# Patient Record
Sex: MALE | Race: WHITE | NOT HISPANIC OR LATINO | Employment: FULL TIME | URBAN - METROPOLITAN AREA
[De-identification: names, ages, dates, MRNs, and addresses within clinical notes are randomized per-mention and may not be internally consistent; named-entity substitution may affect disease eponyms.]

---

## 2024-03-04 DIAGNOSIS — Z95.828 S/P TIPS (TRANSJUGULAR INTRAHEPATIC PORTOSYSTEMIC SHUNT): ICD-10-CM

## 2024-03-04 DIAGNOSIS — K70.31 ALCOHOLIC CIRRHOSIS OF LIVER WITH ASCITES (MULTI): ICD-10-CM

## 2024-03-04 DIAGNOSIS — K76.9 LIVER LESION: Primary | ICD-10-CM

## 2024-03-05 ENCOUNTER — DOCUMENTATION (OUTPATIENT)
Dept: TRANSPLANT | Facility: HOSPITAL | Age: 64
End: 2024-03-05
Payer: COMMERCIAL

## 2024-03-05 ENCOUNTER — TELEPHONE (OUTPATIENT)
Dept: TRANSPLANT | Facility: HOSPITAL | Age: 64
End: 2024-03-05
Payer: COMMERCIAL

## 2024-03-05 ENCOUNTER — HOSPITAL ENCOUNTER (INPATIENT)
Age: 64
End: 2024-03-05
Attending: SURGERY | Admitting: SURGERY
Payer: COMMERCIAL

## 2024-03-05 NOTE — TELEPHONE ENCOUNTER
Called for intake, no answer.  Patient does not have an email set up.  Will attempt to call tomorrow.

## 2024-03-05 NOTE — TELEPHONE ENCOUNTER
How did you hear about [facility]?   MD  Referral Source comments   NONE  Have you ever contacted the [facility] before?   NONE  Are you currently listed or being evaluated by any other transplant center?   NO  Where are you currently listed or being evaluated for transplant?   NO  Are you also seeking other organ transplant(s)?   NO  Are you a ?   NO  Do you have your own transportation?   YES  Do you have a 's license?   YES  Do you have a working vehicle?   YES  Do you have someone in your support system that can drive you to and from all your appointments as needed?   YES  Are you prepared to drive to [facility] in [place], [place] for all necessary appointments? If not, you may need to speak with a  prior to scheduling your appointments. We are unable to assist with transportation or parking.   YES  Transportation Comments   NONE    GENERAL HEALTH STATUS LI  What is the primary cause of your organ disease?   ALCOHOL  Have you had a heart attack?   NO  When was your heart attack?   N/A  Where was your heart attack treated?   N/A  Have you had a stroke?   NO  When was your stroke?   N/A  Where was your stroke treated?   N/A     GENERAL HEALTH STATUS HT  Have you had any pregnancies?   N/A  If yes, how many pregnancies?   N/A  Have you had any transfusions?   YES  If yes, how many transfusions?   3-4  Have you ever been on dialysis?   NO    DIABETES HISTORY LI  What type of diabetes do you have?   TYPE II  Are you taking insulin for your diabetes?   NO    CANCER HISTORY  Where is cancer located?   NO  When was the diagnosis made?   N/A  Who provided cancer treatment?   N/A     MENTAL HEALTH HISTORY LI  Are you currently or have you previously been seen by a mental health professional?   NO  Who is the treating doctor for your mental health issues?   N/A  Did you receive a specific diagnosis?   N/A    SUBSTANCE USE HISTORY LI  Are you a current or  former tobacco user?   NO  Describe your tobacco use.   N/A  When did you quit using tobacco products?   N/A  Are you a current or former alcohol user?   YES  Describe your alcohol use.   SOCIAL, WHICH LED TO EVERY DAY  When did you quit drinking alcohol?   2 YEARS AGO  Have you ever received treatment for alcohol abuse?   NO  When and where did you receive treatment for alcohol abuse?   N/A  Are you a current or former marijuana user?   NO  Describe your marijuana use.   N/A  When did you quit using marijuana?   N/A  Have you ever received treatment for marijuana abuse?   N/A  When and where did you receive treatment for marijuana abuse?   N/A  Are you a current or former user of illegal drugs such as heroin, cocaine, meth, crack, or any other recreational substance?   NO  Describe your illegal drug use.   N/A  When did you quit using illegal drugs?   N/A  Have you ever received treatment for illegal drug abuse?   N/A  When and where did you receive treatment for illegal drug abuse?   N/A    DIAGNOSTIC TESTING LI  Have you had a colonoscopy in the last 5 years?   NO  Where was the colonoscopy performed?   N/A  Have you had a EGD (Endoscopy)?   YES  Where was the EGD(Endoscopy) Performed?   DR TORRES [facility] [place]    COMMENTS  NONE

## 2024-03-07 ENCOUNTER — DOCUMENTATION (OUTPATIENT)
Dept: TRANSPLANT | Facility: HOSPITAL | Age: 64
End: 2024-03-07
Payer: COMMERCIAL

## 2024-03-18 ENCOUNTER — TELEPHONE (OUTPATIENT)
Dept: TRANSPLANT | Facility: HOSPITAL | Age: 64
End: 2024-03-18
Payer: COMMERCIAL

## 2024-03-18 DIAGNOSIS — Z01.818 ENCOUNTER FOR PRE-TRANSPLANT EVALUATION FOR LIVER TRANSPLANT: ICD-10-CM

## 2024-03-20 ENCOUNTER — DOCUMENTATION (OUTPATIENT)
Dept: TRANSPLANT | Facility: HOSPITAL | Age: 64
End: 2024-03-20
Payer: COMMERCIAL

## 2024-03-20 NOTE — Clinical Note
Rec'd [facility] [place] fax for liver txp eval valid 03/18/24-03/17/25.  Listing approval will be needed.

## 2024-03-20 NOTE — PROGRESS NOTES
Rec'd [facility] [place] fax [place] liver txp eval valid 03/18/24-03/17/25.  Listing approval will be needed.

## 2024-03-21 DIAGNOSIS — Z01.818 ENCOUNTER FOR PRE-TRANSPLANT EVALUATION FOR LIVER TRANSPLANT: ICD-10-CM

## 2024-03-27 ENCOUNTER — TELEPHONE (OUTPATIENT)
Dept: TRANSPLANT | Facility: HOSPITAL | Age: 64
End: 2024-03-27
Payer: COMMERCIAL

## 2024-03-27 DIAGNOSIS — K76.9 LIVER LESION, RIGHT LOBE: Primary | ICD-10-CM

## 2024-03-27 DIAGNOSIS — K70.31 ALCOHOLIC CIRRHOSIS OF LIVER WITH ASCITES (MULTI): ICD-10-CM

## 2024-03-27 NOTE — TELEPHONE ENCOUNTER
Patient called to notify office that he had a fall at work and has a fractured sternum and is now on worker's comp. Patient asked about postponing education and initial evaluation appointments. Coordinator reviewed concern for large(4.5cm) lesion in liver and explained process of reviewing imaging at TB and proceeding with recommendation for biopsy or treatment. Agreed on plan to hold off on education for now but will proceed with TB and possible IR referral. Discussed with Dr. Blanco. Added to TB agenda for 4/1/24.

## 2024-04-01 ENCOUNTER — TUMOR BOARD CONFERENCE (OUTPATIENT)
Dept: HEMATOLOGY/ONCOLOGY | Facility: HOSPITAL | Age: 64
End: 2024-04-01
Payer: COMMERCIAL

## 2024-04-01 NOTE — TUMOR BOARD NOTE
Appears to be 2 LIRADS 5 lesions.  Poor quality.  Likely multifocal disease.  Recommend repeat imaging to better assess.  Labs to assess liver function, AFP    No treatment recommendations at this time.

## 2024-04-02 ENCOUNTER — DOCUMENTATION (OUTPATIENT)
Dept: TRANSPLANT | Facility: HOSPITAL | Age: 64
End: 2024-04-02
Payer: COMMERCIAL

## 2024-04-02 ENCOUNTER — APPOINTMENT (OUTPATIENT)
Dept: TRANSPLANT | Facility: HOSPITAL | Age: 64
End: 2024-04-02
Payer: COMMERCIAL

## 2024-04-02 NOTE — PROGRESS NOTES
Spoke to patient and spouse via the phone re [place] benefits.  Patient and spouse work for the same employer.  Patient is working light duty at this time due to workmans comp.  Discussed SS disability.  Patient gets his meds through [employer].

## 2024-04-03 ENCOUNTER — TELEPHONE (OUTPATIENT)
Dept: TRANSPLANT | Facility: HOSPITAL | Age: 64
End: 2024-04-03
Payer: COMMERCIAL

## 2024-04-04 ENCOUNTER — APPOINTMENT (OUTPATIENT)
Dept: TRANSPLANT | Facility: HOSPITAL | Age: 64
End: 2024-04-04
Payer: COMMERCIAL

## 2024-04-04 ENCOUNTER — APPOINTMENT (OUTPATIENT)
Dept: CARDIOLOGY | Facility: HOSPITAL | Age: 64
End: 2024-04-04
Payer: COMMERCIAL

## 2024-04-15 ENCOUNTER — APPOINTMENT (OUTPATIENT)
Dept: TRANSPLANT | Facility: HOSPITAL | Age: 64
End: 2024-04-15
Payer: COMMERCIAL

## 2024-04-26 ENCOUNTER — OFFICE VISIT (OUTPATIENT)
Dept: CARDIOLOGY | Facility: CLINIC | Age: 64
End: 2024-04-26
Payer: COMMERCIAL

## 2024-04-26 VITALS
HEIGHT: 70 IN | SYSTOLIC BLOOD PRESSURE: 90 MMHG | HEART RATE: 63 BPM | WEIGHT: 216 LBS | DIASTOLIC BLOOD PRESSURE: 62 MMHG | BODY MASS INDEX: 30.92 KG/M2

## 2024-04-26 DIAGNOSIS — K74.60 HEPATIC CIRRHOSIS, UNSPECIFIED HEPATIC CIRRHOSIS TYPE, UNSPECIFIED WHETHER ASCITES PRESENT (MULTI): ICD-10-CM

## 2024-04-26 DIAGNOSIS — E11.9 DIABETES MELLITUS TYPE II, NON INSULIN DEPENDENT (MULTI): ICD-10-CM

## 2024-04-26 DIAGNOSIS — R94.31 ABNORMAL EKG: ICD-10-CM

## 2024-04-26 DIAGNOSIS — S26.91XS CONTUSION OF HEART, SEQUELA: Primary | ICD-10-CM

## 2024-04-26 DIAGNOSIS — S22.20XS CLOSED FRACTURE OF STERNUM, UNSPECIFIED PORTION OF STERNUM, SEQUELA: ICD-10-CM

## 2024-04-26 DIAGNOSIS — I48.0 PAROXYSMAL ATRIAL FIBRILLATION (MULTI): ICD-10-CM

## 2024-04-26 PROBLEM — S26.91XA CARDIAC CONTUSION: Status: ACTIVE | Noted: 2024-04-26

## 2024-04-26 PROBLEM — S22.20XA STERNAL FRACTURE: Status: ACTIVE | Noted: 2024-04-26

## 2024-04-26 PROCEDURE — 3074F SYST BP LT 130 MM HG: CPT | Performed by: INTERNAL MEDICINE

## 2024-04-26 PROCEDURE — 1036F TOBACCO NON-USER: CPT | Performed by: INTERNAL MEDICINE

## 2024-04-26 PROCEDURE — 99204 OFFICE O/P NEW MOD 45 MIN: CPT | Performed by: INTERNAL MEDICINE

## 2024-04-26 PROCEDURE — 3008F BODY MASS INDEX DOCD: CPT | Performed by: INTERNAL MEDICINE

## 2024-04-26 PROCEDURE — 3078F DIAST BP <80 MM HG: CPT | Performed by: INTERNAL MEDICINE

## 2024-04-26 PROCEDURE — 93000 ELECTROCARDIOGRAM COMPLETE: CPT | Performed by: INTERNAL MEDICINE

## 2024-04-26 RX ORDER — ONDANSETRON 4 MG/1
1 TABLET, FILM COATED ORAL EVERY 6 HOURS PRN
COMMUNITY
Start: 2022-12-28

## 2024-04-26 RX ORDER — RIFAXIMIN 550 MG/1
1 TABLET ORAL 2 TIMES DAILY
COMMUNITY
Start: 2021-11-30

## 2024-04-26 RX ORDER — FERROUS SULFATE 325(65) MG
1 TABLET ORAL DAILY
COMMUNITY

## 2024-04-26 RX ORDER — FUROSEMIDE 80 MG/1
80 TABLET ORAL DAILY
COMMUNITY
Start: 2023-04-10

## 2024-04-26 RX ORDER — SPIRONOLACTONE 25 MG/1
50 TABLET ORAL 2 TIMES DAILY
COMMUNITY

## 2024-04-26 RX ORDER — SITAGLIPTIN 100 MG/1
100 TABLET, FILM COATED ORAL DAILY
COMMUNITY
Start: 2023-06-01

## 2024-04-26 RX ORDER — METFORMIN HYDROCHLORIDE 500 MG/1
500 TABLET ORAL EVERY 12 HOURS
COMMUNITY
Start: 2018-09-14

## 2024-04-26 RX ORDER — LACTULOSE 10 G/15ML
30 SOLUTION ORAL DAILY
COMMUNITY

## 2024-04-26 RX ORDER — OMEPRAZOLE 20 MG/1
20 CAPSULE, DELAYED RELEASE ORAL
COMMUNITY
Start: 2023-06-05

## 2024-04-26 RX ORDER — PROPRANOLOL HYDROCHLORIDE 80 MG/1
80 CAPSULE, EXTENDED RELEASE ORAL DAILY
COMMUNITY
Start: 2024-02-01

## 2024-04-26 RX ORDER — ACETAMINOPHEN, DIPHENHYDRAMINE HCL, PHENYLEPHRINE HCL 325; 25; 5 MG/1; MG/1; MG/1
TABLET ORAL DAILY
COMMUNITY

## 2024-04-26 ASSESSMENT — ENCOUNTER SYMPTOMS
LIGHT-HEADEDNESS: 1
DIZZINESS: 1

## 2024-04-26 NOTE — PATIENT INSTRUCTIONS
Please bring all medicines, vitamins, and herbal supplements with you when you come to the office.    Prescriptions will not be filled unless you are compliant with your follow up appointments or have a follow up appointment scheduled as per instruction of your physician. Refills should be requested at the time of your visit.     BMI was above normal measurement. Current weight: 98 kg (216 lb)  Weight change since last visit (-) denotes wt loss 216 lbs   Weight loss needed to achieve BMI 25: 42.1 Lbs  Weight loss needed to achieve BMI 30: 7.4 Lbs  Provided instructions on dietary changes.    Echo   Follow up 4-5 months

## 2024-04-26 NOTE — LETTER
April 26, 2024     Shakir Huerta DO  [address]  [address] [place]  [place] [address]    Patient: Travis Traore   YOB: 1960   Date of Visit: 4/26/2024       Dear Dr. Shakir Huerta, DO:    Thank you for referring Travis Traore to me for evaluation. Below are my notes for this consultation.  If you have questions, please do not hesitate to call me. I look forward to following your patient along with you.       Sincerely,     Feliciano Duval MD      CC: No Recipients  ______________________________________________________________________________________    Cardiology Consultation- New Consult    Reason for referral: Questionable atrial fibrillation and abnormal EKG    HPI: Travis Traore is a 63 y.o. male with history of advanced liver cirrhosis in the process of being evaluated for liver transplant.  Was seen recently by corporate health.  Apparently the patient had an accident at work.  Details lacking but apparently a heavy object fell onto his chest.  His wife report he had a sternal fracture treated conservatively.  I do not have any of those records.  Apparently an EKG was done and showed nonspecific ST-T changes but there is notation that observation of atrial fibrillation even though I do not have any documentation of A-fib.  The patient describes some mild chest wall tenderness.  He denies lightheadedness, dizziness or syncope.  He denies any previous history of coronary artery disease, congestive heart failure or valvular heart disease.  He described functional class I and reported he is active.  His EKG today showing normal sinus rhythm with nonspecific ST-T changes.    Assessment    1.  Abnormal EKG showing nonspecific ST-T changes patient does not exhibit any cardiac symptoms and he is low risk for ischemic heart disease  2.  Reported history of chest wall trauma/contusion with questionable sternal fracture and possible previous cardiac confusion does not  appear to have any sequela  3.  Advanced liver to cirrhosis in the process of being evaluated for transplant  4.  Type 2 diabetes mellitus    Plan    1.  I recommended to continue with observant approach  2.  Will try to retrieve his previous record including previous EKG to see if there is any documentation of atrial fibrillation however even if the patient has previous history of A-fib at the moment no need for treatment and he is not a good candidate for anticoagulation considering his advanced liver disease and prior history of GI bleed  3.  Will see him back in the office in 4 months and follow-up      Past Medical History:   He has no past medical history on file.    Surgical History:   He has a past surgical history that includes CT angio abdomen pelvis w and or wo IV IV contrast (03/24/2020); IR intervention tips placement (03/26/2020); IR CVC tunneled (03/26/2020); Cholecystectomy; Back surgery; Wrist surgery; and Colonoscopy (2015).    Family History:   Family History   Problem Relation Name Age of Onset   • Diabetes Mother         Social History:   Social History     Tobacco Use   • Smoking status: Never   • Smokeless tobacco: Never   Substance Use Topics   • Alcohol use: Not Currently        Allergies:  Patient has no known allergies.     Current Medications:    Current Outpatient Medications:   •  cyanocobalamin, vitamin B-12, (Vitamin B-12) 5,000 mcg tablet, sublingual, Place under the tongue once daily., Disp: , Rfl:   •  ferrous sulfate, 325 mg ferrous sulfate, tablet, Take 1 tablet by mouth once daily., Disp: , Rfl:   •  furosemide (Lasix) 80 mg tablet, 1 tablet (80 mg) once daily., Disp: , Rfl:   •  Januvia 100 mg tablet, 1 tablet (100 mg) once daily., Disp: , Rfl:   •  lactulose 20 gram/30 mL oral solution, Take 45 mL (30 g) by mouth once daily., Disp: , Rfl:   •  metFORMIN (Glucophage) 500 mg tablet, 1 tablet (500 mg) every 12 hours., Disp: , Rfl:   •  omeprazole (PriLOSEC) 20 mg DR capsule, 1  "capsule (20 mg) 2 times a day before meals., Disp: , Rfl:   •  ondansetron (Zofran) 4 mg tablet, Take 1 tablet (4 mg) by mouth every 6 hours if needed., Disp: , Rfl:   •  propranolol LA (Inderal LA) 80 mg 24 hr capsule, 1 capsule (80 mg) once daily., Disp: , Rfl:   •  spironolactone (Aldactone) 25 mg tablet, 2 tablets (50 mg) 2 times a day., Disp: , Rfl:   •  Xifaxan 550 mg tablet, 1 tablet (550 mg) 2 times a day., Disp: , Rfl:      Vitals:  Vitals:    04/26/24 1000 04/26/24 1003 04/26/24 1004   BP: 102/64 90/62 90/62   BP Location: Right arm Left arm Left arm   Patient Position: Sitting Sitting Standing   Pulse: 63     Weight: 98 kg (216 lb)     Height: 1.778 m (5' 10\")        EKG done in office today      Review of Systems   Constitutional: Positive for malaise/fatigue.   Neurological:  Positive for dizziness and light-headedness.       Objective        Physical Exam  Constitutional:       Appearance: Normal appearance.   HENT:      Nose: Nose normal.   Neck:      Vascular: No carotid bruit.   Cardiovascular:      Rate and Rhythm: Normal rate.      Pulses: Normal pulses.      Heart sounds: Normal heart sounds.   Pulmonary:      Effort: Pulmonary effort is normal.   Abdominal:      General: Bowel sounds are normal.      Palpations: Abdomen is soft.   Musculoskeletal:         General: Normal range of motion.      Cervical back: Normal range of motion.      Right lower leg: No edema.      Left lower leg: No edema.   Skin:     General: Skin is warm and dry.   Neurological:      General: No focal deficit present.      Mental Status: He is alert.   Psychiatric:         Mood and Affect: Mood normal.         Behavior: Behavior normal.         Thought Content: Thought content normal.         Judgment: Judgment normal.                Assessment and Plan:   1. Contusion of heart, sequela        2. Abnormal EKG  Follow Up In Cardiology    ECG 12 Lead    Transthoracic Echo Complete      3. BMI 30.0-30.9,adult        4. Hepatic " cirrhosis, unspecified hepatic cirrhosis type, unspecified whether ascites present (Multi)        5. Closed fracture of sternum, unspecified portion of sternum, sequela        6. Diabetes mellitus type II, non insulin dependent (Multi)               Scribe Attestation  By signing my name below, Niya CHANDLER LPN, Scribe   attest that this documentation has been prepared under the direction and in the presence of Feliciano Duval MD.    Provider Attestation - Scribe documentation    All medical record entries made by the Scribe were at my direction and personally dictated by me. I have reviewed the chart and agree that the record accurately reflects my personal performance of the history, physical exam, discussion and plan.

## 2024-04-26 NOTE — PROGRESS NOTES
Cardiology Consultation- New Consult    Reason for referral: Questionable atrial fibrillation and abnormal EKG    HPI: Travis Traore is a 63 y.o. male with history of advanced liver cirrhosis in the process of being evaluated for liver transplant.  Was seen recently by corporate health.  Apparently the patient had an accident at work.  Details lacking but apparently a heavy object fell onto his chest.  His wife report he had a sternal fracture treated conservatively.  He did have an emergency room visit and imaging according to the wife that confirmed that.  I do not have any of those records.  Following the office visit I was able to retrieve an EKG dated 4/4 2024 showing atrial fibrillation.  Also showing nonspecific ST-T changes.  The patient reports mild chest wall tenderness.  However he described functional class I.  He denies exertional chest pain, lightheadedness, dizziness or syncope.  The patient had no previous history of coronary artery disease or congestive heart failure.  Is relatively low risk for ischemic heart disease.  He does have documented history of advanced liver cirrhosis and reports he is in the process of being evaluated for liver transplantation.  He reports previous history of GI bleed and esophageal pheresis requiring TIPS procedure    Assessment    1.  Abnormal EKG showing nonspecific ST-T changes patient does not exhibit any cardiac symptoms and he is low risk for ischemic heart disease  2.  Reported history of chest wall trauma/contusion with questionable sternal fracture and possible previous cardiac confusion does not appear to have any sequela  3.  Documented episode of paroxysmal atrial fibrillation currently in normal sinus rhythm  4.  Advanced liver to cirrhosis in the process of being evaluated for transplant with history of esophageal varices and prior TIPS procedure and area of previous history of severe GI bleed I recommended an echocardiogram  4.  Type 2 diabetes  mellitus    Plan    1.  I recommended to continue with observant approach  2.  I recommended an echocardiogram  3.  We discussed ischemic evaluation however the patient appears to be very active and described functional class I and denies any ischemic symptoms  4.  In regard to his atrial fibrillation it appears asymptomatic and has not reoccurred.  In my opinion the patient has low CHADS2 vascular score and he is high risk for anticoagulation due to his GI pathology and I would recommend continue with observant approach  5.  Follow-up after testing is done the patient was advised to notify me if he develop any cardiac symptoms      Past Medical History:   He has no past medical history on file.    Surgical History:   He has a past surgical history that includes CT angio abdomen pelvis w and or wo IV IV contrast (03/24/2020); IR intervention tips placement (03/26/2020); IR CVC tunneled (03/26/2020); Cholecystectomy; Back surgery; Wrist surgery; and Colonoscopy (2015).    Family History:   Family History   Problem Relation Name Age of Onset    Diabetes Mother         Social History:   Social History     Tobacco Use    Smoking status: Never    Smokeless tobacco: Never   Substance Use Topics    Alcohol use: Not Currently        Allergies:  Patient has no known allergies.     Current Medications:    Current Outpatient Medications:     cyanocobalamin, vitamin B-12, (Vitamin B-12) 5,000 mcg tablet, sublingual, Place under the tongue once daily., Disp: , Rfl:     ferrous sulfate, 325 mg ferrous sulfate, tablet, Take 1 tablet by mouth once daily., Disp: , Rfl:     furosemide (Lasix) 80 mg tablet, 1 tablet (80 mg) once daily., Disp: , Rfl:     Januvia 100 mg tablet, 1 tablet (100 mg) once daily., Disp: , Rfl:     lactulose 20 gram/30 mL oral solution, Take 45 mL (30 g) by mouth once daily., Disp: , Rfl:     metFORMIN (Glucophage) 500 mg tablet, 1 tablet (500 mg) every 12 hours., Disp: , Rfl:     omeprazole (PriLOSEC) 20 mg  "DR capsule, 1 capsule (20 mg) 2 times a day before meals., Disp: , Rfl:     ondansetron (Zofran) 4 mg tablet, Take 1 tablet (4 mg) by mouth every 6 hours if needed., Disp: , Rfl:     propranolol LA (Inderal LA) 80 mg 24 hr capsule, 1 capsule (80 mg) once daily., Disp: , Rfl:     spironolactone (Aldactone) 25 mg tablet, 2 tablets (50 mg) 2 times a day., Disp: , Rfl:     Xifaxan 550 mg tablet, 1 tablet (550 mg) 2 times a day., Disp: , Rfl:      Vitals:  Vitals:    04/26/24 1000 04/26/24 1003 04/26/24 1004   BP: 102/64 90/62 90/62   BP Location: Right arm Left arm Left arm   Patient Position: Sitting Sitting Standing   Pulse: 63     Weight: 98 kg (216 lb)     Height: 1.778 m (5' 10\")        EKG done in office today      Review of Systems   Constitutional: Positive for malaise/fatigue.   Neurological:  Positive for dizziness and light-headedness.       Objective         Physical Exam  Constitutional:       Appearance: Normal appearance.   HENT:      Nose: Nose normal.   Neck:      Vascular: No carotid bruit.   Cardiovascular:      Rate and Rhythm: Normal rate.      Pulses: Normal pulses.      Heart sounds: Normal heart sounds.   Pulmonary:      Effort: Pulmonary effort is normal.   Abdominal:      General: Bowel sounds are normal.      Palpations: Abdomen is soft.   Musculoskeletal:         General: Normal range of motion.      Cervical back: Normal range of motion.      Right lower leg: No edema.      Left lower leg: No edema.   Skin:     General: Skin is warm and dry.   Neurological:      General: No focal deficit present.      Mental Status: He is alert.   Psychiatric:         Mood and Affect: Mood normal.         Behavior: Behavior normal.         Thought Content: Thought content normal.         Judgment: Judgment normal.                Assessment and Plan:   1. Contusion of heart, sequela        2. Abnormal EKG  Follow Up In Cardiology    ECG 12 Lead    Transthoracic Echo Complete      3. BMI 30.0-30.9,adult      "   4. Hepatic cirrhosis, unspecified hepatic cirrhosis type, unspecified whether ascites present (Multi)        5. Closed fracture of sternum, unspecified portion of sternum, sequela        6. Diabetes mellitus type II, non insulin dependent (Multi)        7. Paroxysmal atrial fibrillation (Multi)               Scribe Attestation  By signing my name below, Niya CHANDLER LPN, Scribe   attest that this documentation has been prepared under the direction and in the presence of Feliciano Duval MD.    Provider Attestation - Scribe documentation    All medical record entries made by the Scribe were at my direction and personally dictated by me. I have reviewed the chart and agree that the record accurately reflects my personal performance of the history, physical exam, discussion and plan.

## 2024-04-30 PROCEDURE — 93306 TTE W/DOPPLER COMPLETE: CPT | Performed by: INTERNAL MEDICINE

## 2024-05-02 ENCOUNTER — TELEPHONE (OUTPATIENT)
Dept: TRANSPLANT | Facility: HOSPITAL | Age: 64
End: 2024-05-02
Payer: COMMERCIAL

## 2024-05-02 NOTE — TELEPHONE ENCOUNTER
Called and could not leave a voicemail with [employer] so called spouse and left a voicemail with Ms. Villalobos.  I asked for a call back in regards to his evaluation

## 2024-05-02 NOTE — TELEPHONE ENCOUNTER
Spouse of patient called back.   is currently at [facility] in [place] being treated for a stroke.  Wife with keep us updated on his progress.

## 2024-05-21 ENCOUNTER — TELEPHONE (OUTPATIENT)
Dept: TRANSPLANT | Facility: HOSPITAL | Age: 64
End: 2024-05-21
Payer: COMMERCIAL

## 2024-05-24 ENCOUNTER — TELEPHONE (OUTPATIENT)
Dept: TRANSPLANT | Facility: HOSPITAL | Age: 64
End: 2024-05-24
Payer: COMMERCIAL

## 2024-05-28 ENCOUNTER — APPOINTMENT (OUTPATIENT)
Dept: CARDIOLOGY | Facility: CLINIC | Age: 64
End: 2024-05-28
Payer: COMMERCIAL

## 2024-07-02 ENCOUNTER — APPOINTMENT (OUTPATIENT)
Dept: CARDIOLOGY | Facility: CLINIC | Age: 64
End: 2024-07-02
Payer: COMMERCIAL

## 2024-07-15 ENCOUNTER — TELEPHONE (OUTPATIENT)
Dept: CARDIOLOGY | Facility: CLINIC | Age: 64
End: 2024-07-15
Payer: COMMERCIAL

## 2024-07-15 NOTE — TELEPHONE ENCOUNTER
Patient wife left vm via the nurse line states wants to cancel Pending ECHO schedule 07/18/2024. Patient had echo at [facility] 04/30/2024, result in chart, forwarded to Dr. Feliciano Duval MD for review.     SO sec notified to cancel

## 2024-07-18 ENCOUNTER — APPOINTMENT (OUTPATIENT)
Dept: CARDIOLOGY | Facility: CLINIC | Age: 64
End: 2024-07-18
Payer: COMMERCIAL

## 2024-07-29 ENCOUNTER — APPOINTMENT (OUTPATIENT)
Dept: CARDIOLOGY | Facility: CLINIC | Age: 64
End: 2024-07-29
Payer: COMMERCIAL

## 2024-08-02 ENCOUNTER — TELEPHONE (OUTPATIENT)
Dept: TRANSPLANT | Facility: HOSPITAL | Age: 64
End: 2024-08-02
Payer: COMMERCIAL

## 2024-08-02 NOTE — TELEPHONE ENCOUNTER
Called and spoke to Mr. Traore regarding transplant referral asking if he still wanted to proceed. Patient states he was hospitalized for a stroke and seizures and has PT and Speech taking care of him.  Patient will speak to Dr. Richardson to ask if he is able to begin evaluation and he will call us back next week 08/05/24

## 2024-08-09 ENCOUNTER — TELEPHONE (OUTPATIENT)
Dept: TRANSPLANT | Facility: HOSPITAL | Age: 64
End: 2024-08-09
Payer: COMMERCIAL

## 2024-08-09 NOTE — TELEPHONE ENCOUNTER
"Patient is going to his \"blood doctor\" and would like to wait to see what Dr. Richardson says. Patient will be seeing him in another couple weeks.  "

## 2024-08-23 ENCOUNTER — APPOINTMENT (OUTPATIENT)
Dept: CARDIOLOGY | Facility: CLINIC | Age: 64
End: 2024-08-23
Payer: COMMERCIAL

## 2024-08-26 NOTE — PROGRESS NOTES
Assessment and Plan:  Mr. Traore is a 64-year-old man with advanced liver disease, 2 separate HCC tumors that are greater than 3 cm, and general debilitation after a stroke earlier this year.  We spent some time discussing his disease course and treatment options.  I do not believe that surgery or transplant are options for him.  I offered to set up an appointment with our interventional radiologist to discuss liver directed therapies and he is interested in this conversation.  He will also reconnect with Dr. Caro about chemotherapy.    I spent 60 minutes in the professional and overall care of this patient.    Akshat Moore MD  Assistant Professor of Surgery  Division of Surgical Oncology  [phone]  [email or web address]      History Of Present Illness  Travis Traore is a 64 y.o. male referred by Keny Caro for HCC.    The patient has a history of end-stage liver disease due to alcoholic cirrhosis (last drink 2020), seizures, esophageal varices with bleeding status post TIPS in 2020, type 2 diabetes, and stroke in May of this year.  He underwent an MRI in February that showed 2 LIRADS 5 lesions in his liver, 3.9cm in segment 7 (peripheral) and 3.2cm in segment 2/4 (central), although poor quality imaging. Most recent AFP 96, platelets 35. He was referred to the liver team at  earlier this year but did not follow up. I don't see any other recent labs.    All other systems have been reviewed and are negative except as noted in the HPI.    I personally reviewed all necessary laboratory results, pathology reports, and radiologic images for this patient.    Past Medical History  He has no past medical history on file.    Surgical History  He has a past surgical history that includes CT angio abdomen pelvis w and or wo IV IV contrast (03/24/2020); IR intervention tips placement (03/26/2020); IR CVC tunneled (03/26/2020); Cholecystectomy; Back surgery; Wrist surgery; and Colonoscopy (2015).    "  Social History  He reports that he has never smoked. He has never used smokeless tobacco. He reports that he does not currently use alcohol. He reports that he does not use drugs.    Family History  Family History   Problem Relation Name Age of Onset    Diabetes Mother          Allergies  Patient has no known allergies.     Last Recorded Vitals  Blood pressure 101/63, pulse 57, temperature 36.4 °C (97.5 °F), temperature source Temporal, resp. rate 14, height 1.728 m (5' 8.03\"), weight 108 kg (237 lb 1.6 oz), SpO2 98%.    Physical Exam  General: no acute distress, well-nourished, slowed speech  Eyes: intact EOM, no scleral icterus  ENT: hearing intact, no drainage  Respiratory: symmetric chest rise, no cough  Cardiovascular: intact distal pulses, no pitting edema  Abdominal: soft, nontender, nondistended  Musculoskeletal: no deformities, intact strength  Integumentary: warm, dry, no lymphadenopathy  Neuro: no focal deficits, sensation intact  Psych: normal mood and affect                     "

## 2024-08-27 ENCOUNTER — OFFICE VISIT (OUTPATIENT)
Dept: SURGICAL ONCOLOGY | Facility: CLINIC | Age: 64
End: 2024-08-27
Payer: COMMERCIAL

## 2024-08-27 VITALS
RESPIRATION RATE: 14 BRPM | DIASTOLIC BLOOD PRESSURE: 63 MMHG | TEMPERATURE: 97.5 F | OXYGEN SATURATION: 98 % | HEART RATE: 57 BPM | WEIGHT: 237.1 LBS | SYSTOLIC BLOOD PRESSURE: 101 MMHG | HEIGHT: 68 IN | BODY MASS INDEX: 35.94 KG/M2

## 2024-08-27 DIAGNOSIS — C22.0 HEPATOCELLULAR CARCINOMA (MULTI): Primary | ICD-10-CM

## 2024-08-27 PROCEDURE — 1036F TOBACCO NON-USER: CPT | Performed by: STUDENT IN AN ORGANIZED HEALTH CARE EDUCATION/TRAINING PROGRAM

## 2024-08-27 PROCEDURE — 99204 OFFICE O/P NEW MOD 45 MIN: CPT | Performed by: STUDENT IN AN ORGANIZED HEALTH CARE EDUCATION/TRAINING PROGRAM

## 2024-08-27 PROCEDURE — 3008F BODY MASS INDEX DOCD: CPT | Performed by: STUDENT IN AN ORGANIZED HEALTH CARE EDUCATION/TRAINING PROGRAM

## 2024-08-27 PROCEDURE — 3078F DIAST BP <80 MM HG: CPT | Performed by: STUDENT IN AN ORGANIZED HEALTH CARE EDUCATION/TRAINING PROGRAM

## 2024-08-27 PROCEDURE — 3074F SYST BP LT 130 MM HG: CPT | Performed by: STUDENT IN AN ORGANIZED HEALTH CARE EDUCATION/TRAINING PROGRAM

## 2024-08-27 PROCEDURE — 99214 OFFICE O/P EST MOD 30 MIN: CPT | Performed by: STUDENT IN AN ORGANIZED HEALTH CARE EDUCATION/TRAINING PROGRAM

## 2024-08-27 ASSESSMENT — ENCOUNTER SYMPTOMS
OCCASIONAL FEELINGS OF UNSTEADINESS: 0
DEPRESSION: 0
LOSS OF SENSATION IN FEET: 0

## 2024-08-27 ASSESSMENT — PAIN SCALES - GENERAL: PAINLEVEL: 0-NO PAIN

## 2024-08-27 NOTE — LETTER
August 27, 2024     Kevin Caro DO  [address] [place] [address]    Patient: Travis Traore   YOB: 1960   Date of Visit: 8/27/2024       Dear Dr. Kevin Caro, :    Thank you for referring Travis Traore to me for evaluation. Below are my notes for this consultation.  If you have questions, please do not hesitate to call me. I look forward to following your patient along with you.       Sincerely,     Akshat Moore MD      CC: Alexandru Weinstein MD  ______________________________________________________________________________________    Assessment and Plan:  Mr. Traore is a 64-year-old man with advanced liver disease, 2 separate HCC tumors that are greater than 3 cm, and general debilitation after a stroke earlier this year.  We spent some time discussing his disease course and treatment options.  I do not believe that surgery or transplant are options for him.  I offered to set up an appointment with our interventional radiologist to discuss liver directed therapies and he is interested in this conversation.  He will also reconnect with Dr. Caro about chemotherapy.    I spent 60 minutes in the professional and overall care of this patient.    Akshat Moore MD  Assistant Professor of Surgery  Division of Surgical Oncology  [phone]  [email or web address]      History Of Present Illness  Travis Traore is a 64 y.o. male referred by Keny Caro for HCC.    The patient has a history of end-stage liver disease due to alcoholic cirrhosis (last drink 2020), seizures, esophageal varices with bleeding status post TIPS in 2020, type 2 diabetes, and stroke in May of this year.  He underwent an MRI in February that showed 2 LIRADS 5 lesions in his liver, 3.9cm in segment 7 (peripheral) and 3.2cm in segment 2/4 (central), although poor quality imaging. Most recent AFP 96, platelets 35. He was referred to the liver team at  earlier this year but did not follow up. I don't  "see any other recent labs.    All other systems have been reviewed and are negative except as noted in the HPI.    I personally reviewed all necessary laboratory results, pathology reports, and radiologic images for this patient.    Past Medical History  He has no past medical history on file.    Surgical History  He has a past surgical history that includes CT angio abdomen pelvis w and or wo IV IV contrast (03/24/2020); IR intervention tips placement (03/26/2020); IR CVC tunneled (03/26/2020); Cholecystectomy; Back surgery; Wrist surgery; and Colonoscopy (2015).     Social History  He reports that he has never smoked. He has never used smokeless tobacco. He reports that he does not currently use alcohol. He reports that he does not use drugs.    Family History  Family History   Problem Relation Name Age of Onset   • Diabetes Mother          Allergies  Patient has no known allergies.     Last Recorded Vitals  Blood pressure 101/63, pulse 57, temperature 36.4 °C (97.5 °F), temperature source Temporal, resp. rate 14, height 1.728 m (5' 8.03\"), weight 108 kg (237 lb 1.6 oz), SpO2 98%.    Physical Exam  General: no acute distress, well-nourished, slowed speech  Eyes: intact EOM, no scleral icterus  ENT: hearing intact, no drainage  Respiratory: symmetric chest rise, no cough  Cardiovascular: intact distal pulses, no pitting edema  Abdominal: soft, nontender, nondistended  Musculoskeletal: no deformities, intact strength  Integumentary: warm, dry, no lymphadenopathy  Neuro: no focal deficits, sensation intact  Psych: normal mood and affect                     "

## 2024-09-03 DIAGNOSIS — C22.0 HEPATOCELLULAR CARCINOMA (MULTI): Primary | ICD-10-CM

## 2024-09-06 ENCOUNTER — LAB (OUTPATIENT)
Dept: LAB | Facility: LAB | Age: 64
End: 2024-09-06
Payer: COMMERCIAL

## 2024-09-06 DIAGNOSIS — C22.0 HEPATOCELLULAR CARCINOMA (MULTI): ICD-10-CM

## 2024-09-06 LAB
AFP SERPL-MCNC: 192 NG/ML (ref 0–9)
ALBUMIN SERPL BCP-MCNC: 3 G/DL (ref 3.4–5)
ALP SERPL-CCNC: 100 U/L (ref 33–136)
ALT SERPL W P-5'-P-CCNC: 31 U/L (ref 10–52)
ANION GAP SERPL CALC-SCNC: 9 MMOL/L (ref 10–20)
AST SERPL W P-5'-P-CCNC: 37 U/L (ref 9–39)
BILIRUB DIRECT SERPL-MCNC: 1.2 MG/DL (ref 0–0.3)
BILIRUB SERPL-MCNC: 3.8 MG/DL (ref 0–1.2)
BUN SERPL-MCNC: 7 MG/DL (ref 6–23)
CALCIUM SERPL-MCNC: 8.6 MG/DL (ref 8.6–10.3)
CHLORIDE SERPL-SCNC: 106 MMOL/L (ref 98–107)
CO2 SERPL-SCNC: 27 MMOL/L (ref 21–32)
CREAT SERPL-MCNC: 0.58 MG/DL (ref 0.5–1.3)
EGFRCR SERPLBLD CKD-EPI 2021: >90 ML/MIN/1.73M*2
ERYTHROCYTE [DISTWIDTH] IN BLOOD BY AUTOMATED COUNT: 14 % (ref 11.5–14.5)
GLUCOSE SERPL-MCNC: 145 MG/DL (ref 74–99)
HCT VFR BLD AUTO: 39.3 % (ref 41–52)
HGB BLD-MCNC: 14.2 G/DL (ref 13.5–17.5)
INR PPP: 1.4 (ref 0.9–1.1)
MCH RBC QN AUTO: 35.9 PG (ref 26–34)
MCHC RBC AUTO-ENTMCNC: 36.1 G/DL (ref 32–36)
MCV RBC AUTO: 99 FL (ref 80–100)
NRBC BLD-RTO: 0 /100 WBCS (ref 0–0)
PLATELET # BLD AUTO: 50 X10*3/UL (ref 150–450)
POTASSIUM SERPL-SCNC: 4.8 MMOL/L (ref 3.5–5.3)
PROT SERPL-MCNC: 4.6 G/DL (ref 6.4–8.2)
PROTHROMBIN TIME: 15.5 SECONDS (ref 9.8–12.8)
RBC # BLD AUTO: 3.96 X10*6/UL (ref 4.5–5.9)
SODIUM SERPL-SCNC: 137 MMOL/L (ref 136–145)
WBC # BLD AUTO: 4.3 X10*3/UL (ref 4.4–11.3)

## 2024-09-06 PROCEDURE — 85610 PROTHROMBIN TIME: CPT

## 2024-09-06 PROCEDURE — 36415 COLL VENOUS BLD VENIPUNCTURE: CPT

## 2024-09-06 PROCEDURE — 85027 COMPLETE CBC AUTOMATED: CPT

## 2024-09-06 PROCEDURE — 82248 BILIRUBIN DIRECT: CPT

## 2024-09-06 PROCEDURE — 80053 COMPREHEN METABOLIC PANEL: CPT

## 2024-09-06 PROCEDURE — 82105 ALPHA-FETOPROTEIN SERUM: CPT

## 2024-09-16 ENCOUNTER — APPOINTMENT (OUTPATIENT)
Dept: CARDIOLOGY | Facility: CLINIC | Age: 64
End: 2024-09-16
Payer: COMMERCIAL

## 2024-09-17 ENCOUNTER — HOSPITAL ENCOUNTER (OUTPATIENT)
Dept: RADIOLOGY | Facility: HOSPITAL | Age: 64
Discharge: HOME | End: 2024-09-17
Payer: COMMERCIAL

## 2024-09-17 DIAGNOSIS — C22.0 HEPATOCELLULAR CARCINOMA (MULTI): ICD-10-CM

## 2024-09-17 NOTE — H&P
History Of Present Illness  Travis Traore is a 64 y.o. male presenting with a history of cirrhosis, ascites, esophageal varices and variceal bleeding status post TIPS (2020), DM2, hypertension, hepatic encephalopathy, and recent CVA at the beginning of this year. He has two LI-Rads 5 lesion of the liver. He discussed with Dr. Moore but not a surgical or transplant candidate. He presents by telephone with his wife to discuss potential Y90 therapy for the hepatic tumors.     Past Medical History  No past medical history on file.    Surgical History  Past Surgical History:   Procedure Laterality Date    BACK SURGERY      L4 L5    CHOLECYSTECTOMY      COLONOSCOPY  2015    CT ABDOMEN PELVIS ANGIOGRAM W AND/OR WO IV CONTRAST  03/24/2020    CT ABDOMEN PELVIS ANGIOGRAM W AND/OR WO IV CONTRAST 3/24/2020 [facility]    IR CVC TUNNELED  03/26/2020    IR CVC TUNNELED 3/26/2020 [facility] INPATIENT [facility]GAC[facility]    IR INTERVENTION TIPS PLACEMENT  03/26/2020    IR INTERVENTION TIPS PLACEMENT [facility]    [facility]          Social History  He reports that he has never smoked. He has never used smokeless tobacco. He reports that he does not currently use alcohol. He reports that he does not use drugs.    Family History  Family History   Problem Relation Name Age of Onset    Diabetes Mother          Allergies  Patient has no known allergies.     Last Recorded Vitals  There were no vitals taken for this visit.    Relevant Results     Bilirubin 3.8, albumin 3.0, INR 1.4, PLTS 50    Parviz B9 based on these values and absent ascites on the prior MRI     Assessment/Plan   Assessment & Plan  Hepatocellular carcinoma (Multi)      I had a discussion with the patient about the nature of radioembolization and how it may be used to treat patients with liver tumors in the setting of cirrhosis. I discussed with the patient the risks of the procedure, including the risk of off target radioembolization, radiation induced  pulmonary fibrosis, and radiation induced liver disease.     In his case I am concerned about his underlying liver function. He has been fairly stable over time with bilirubin number in the 3-3.8 range for several years. I would prefer that the bilirubin was closer to 3. Would have to be very selective with treatment and would plan on segmental treatment, no option for lobar treatment. The TIPS is not a contraindication (https://pubmed.ncbi.nlm.nih.gov/58296728/#:~:text=Conclusions%3A%20Segmental%20Y90%20for%20HCC,these%20patients%20to%20liver%20transplantation.) We discussed the risk and benefits in relation to liver failure at length. I will ask the patient's hepatologist to weight in on how well they believe the patient is compensated (Dr. Adali Phillips at [facility])    I discussed the two-step process of radioembolization, including the pre treatment MAA procedure done for the purposes of dosimetry, evaluating a lung shunt fraction, and identifying potential collateral arteries. I also discussed the nature of post embolic syndrome and the course for treating the symptoms.     We discussed the need for follow up imaging to assess the treatment, evaluate for residual or recurrent disease at the tumor sites, or distally.    The patient was understanding and asked appropriate questions about the procedure, its risks, and benefits.         I spent 50 minutes in the professional and overall care of this patient.      Aelxandru Weinstein MD

## 2024-10-17 NOTE — Clinical Note
Spoke to patient for [facility] appt. 
PROCEDURES:  Exploratory laparotomy 17-Oct-2024 04:09:42  Margie David  Exploratory laparotomy with Alec procedure 17-Oct-2024 04:10:28  Margie David

## 2024-10-22 ENCOUNTER — APPOINTMENT (OUTPATIENT)
Dept: CARDIOLOGY | Facility: CLINIC | Age: 64
End: 2024-10-22
Payer: COMMERCIAL

## 2024-10-22 VITALS
HEIGHT: 68 IN | HEART RATE: 60 BPM | SYSTOLIC BLOOD PRESSURE: 104 MMHG | DIASTOLIC BLOOD PRESSURE: 64 MMHG | BODY MASS INDEX: 37.59 KG/M2 | WEIGHT: 248 LBS

## 2024-10-22 DIAGNOSIS — S22.20XS CLOSED FRACTURE OF STERNUM, UNSPECIFIED PORTION OF STERNUM, SEQUELA: ICD-10-CM

## 2024-10-22 DIAGNOSIS — R94.31 ABNORMAL EKG: ICD-10-CM

## 2024-10-22 DIAGNOSIS — S26.91XS CONTUSION OF HEART, SEQUELA: ICD-10-CM

## 2024-10-22 DIAGNOSIS — I63.9 CRYPTOGENIC STROKE (MULTI): ICD-10-CM

## 2024-10-22 DIAGNOSIS — R60.9 EDEMA, UNSPECIFIED TYPE: ICD-10-CM

## 2024-10-22 DIAGNOSIS — R56.9 SEIZURE (MULTI): ICD-10-CM

## 2024-10-22 DIAGNOSIS — I48.0 PAROXYSMAL ATRIAL FIBRILLATION (MULTI): Primary | ICD-10-CM

## 2024-10-22 DIAGNOSIS — K74.60 HEPATIC CIRRHOSIS, UNSPECIFIED HEPATIC CIRRHOSIS TYPE, UNSPECIFIED WHETHER ASCITES PRESENT (MULTI): ICD-10-CM

## 2024-10-22 PROCEDURE — 3078F DIAST BP <80 MM HG: CPT | Performed by: INTERNAL MEDICINE

## 2024-10-22 PROCEDURE — 99215 OFFICE O/P EST HI 40 MIN: CPT | Performed by: INTERNAL MEDICINE

## 2024-10-22 PROCEDURE — 3074F SYST BP LT 130 MM HG: CPT | Performed by: INTERNAL MEDICINE

## 2024-10-22 PROCEDURE — 3008F BODY MASS INDEX DOCD: CPT | Performed by: INTERNAL MEDICINE

## 2024-10-22 PROCEDURE — 1036F TOBACCO NON-USER: CPT | Performed by: INTERNAL MEDICINE

## 2024-10-22 RX ORDER — FUROSEMIDE 80 MG/1
80 TABLET ORAL
Qty: 180 TABLET | Refills: 3 | Status: SHIPPED | OUTPATIENT
Start: 2024-10-22 | End: 2025-10-22

## 2024-10-22 RX ORDER — INSULIN ASPART 100 [IU]/ML
INJECTION, SOLUTION INTRAVENOUS; SUBCUTANEOUS DAILY
COMMUNITY

## 2024-10-22 RX ORDER — LEVETIRACETAM 750 MG/1
1500 TABLET ORAL 2 TIMES DAILY
COMMUNITY
Start: 2024-06-11 | End: 2025-06-11

## 2024-10-22 RX ORDER — LACOSAMIDE 100 MG/1
100 TABLET ORAL 2 TIMES DAILY
COMMUNITY
Start: 2024-06-11 | End: 2025-06-11

## 2024-10-22 RX ORDER — SPIRONOLACTONE 100 MG/1
100 TABLET, FILM COATED ORAL DAILY
COMMUNITY
Start: 2024-05-28

## 2024-10-22 RX ORDER — INSULIN GLARGINE-YFGN 100 [IU]/ML
INJECTION, SOLUTION SUBCUTANEOUS EVERY 24 HOURS
COMMUNITY

## 2024-10-22 NOTE — PROGRESS NOTES
Subjective   Travis Traore is a 64 y.o. male       Chief Complaint    Follow-up          HPI   Patient is here for follow-up to management for recent evaluation for paroxysmal atrial fibrillation and abnormal ECG, he does have a history of advanced liver disease.  Since last time I saw him he was in the hospital with cryptogenic stroke.  He underwent extensive evaluation with no clear etiology was found.  Unfortunately due to his advanced liver disease and the development of seizure and documented severe thrombocytopenia due to liver disease he was not felt to be a good candidate for aspirin or anticoagulation.  He report he does have some liver lesion and is scheduled to see someone in the [facility].  Since the last time I saw him he gained quite a bit of weight and he had +2 edema.  I was able to retrieve and review his echocardiogram    Assessment     1.  Abnormal EKG showing nonspecific ST-T changes.  Patient not good candidate for invasive cardiac evaluation due to very advanced liver disease and severe thrombocytopenia.  We discussed that and with lack of chest pain or angina we elected to continue with conservative management  2.  Previous history of chest wall trauma/contusion with questionable sternal fracture and possible previous cardiac confusion does not appear to have any sequela  3.  Documented episode of paroxysmal atrial fibrillation currently in normal sinus rhythm.  Patient felt not to be a good candidate for anticoagulation due to history of seizure, advanced liver disease and severe thrombocytopenia  4.  Advanced liver to cirrhosis he was in the process of being evaluated for transplant with history of esophageal varices and prior TIPS procedure and area of previous history of severe GI bleed..  Patient clearly have significant volume overload and recent increased weight gain since his stroke  4.  Type 2 diabetes mellitus  5.  Recent hospitalization for cryptogenic stroke and seizure followed  "by neurology.  Again was felt not good candidate to be on antiplatelet or anticoagulation     Plan     1.  I recommended to continue with observant approach  2.  I reviewed his recent hospitalization record and echocardiogram  3.  I advised him to increase his Lasix to 80 mg twice daily and to have basic metabolic profile in addition I recommended salt restriction  4.  We discussed that show anticoagulation again but the family agree that he is not a good candidate due to history of seizure, prior GI bleed, advanced liver disease and risk of fall with coexisting thrombocytopenia  5.  I will see him back in the office in 6 months and follow-up  6.  I advised the family if he loses more than 15 pounds to start using the evening dose of Lasix on as-needed basis  Review of Systems   All other systems reviewed and are negative.           Vitals:    10/22/24 1309   BP: 104/64   BP Location: Left arm   Patient Position: Sitting   Pulse: 60   Weight: 112 kg (248 lb)   Height: 1.727 m (5' 8\")        Objective   Physical Exam  Constitutional:       Appearance: Normal appearance.   HENT:      Nose: Nose normal.   Neck:      Vascular: No carotid bruit.   Cardiovascular:      Rate and Rhythm: Normal rate.      Pulses: Normal pulses.      Heart sounds: Normal heart sounds.   Pulmonary:      Effort: Pulmonary effort is normal.   Abdominal:      General: Bowel sounds are normal.      Palpations: Abdomen is soft.   Musculoskeletal:         General: Normal range of motion.      Cervical back: Normal range of motion.      Right lower le+ Edema present.      Left lower le+ Edema present.   Skin:     General: Skin is warm and dry.   Neurological:      General: No focal deficit present.      Mental Status: He is alert.   Psychiatric:         Mood and Affect: Mood normal.         Behavior: Behavior normal.         Thought Content: Thought content normal.         Judgment: Judgment normal.         Allergies  Patient has no known " allergies.     Current Medications    Current Outpatient Medications:     cyanocobalamin, vitamin B-12, (Vitamin B-12) 5,000 mcg tablet, sublingual, Place under the tongue once daily., Disp: , Rfl:     ferrous sulfate, 325 mg ferrous sulfate, tablet, Take 1 tablet (325 mg) by mouth once daily., Disp: , Rfl:     insulin aspart (NovoLOG Flexpen U-100 Insulin) 100 unit/mL (3 mL) pen, Inject under the skin early in the morning.. Take as directed per insulin instructions., Disp: , Rfl:     insulin glargine-yfgn (Semglee,insulin glargine-yfgn,) 100 unit/mL vial, Inject under the skin once every 24 hours. Take as directed per insulin instructions., Disp: , Rfl:     lacosamide (Vimpat) 100 mg tablet, Take 1 tablet (100 mg) by mouth twice a day., Disp: , Rfl:     lactulose 20 gram/30 mL oral solution, Take 45 mL (30 g) by mouth once daily. (Patient taking differently: Take 45 mL (30 g) by mouth 3 times a day.), Disp: , Rfl:     levETIRAcetam (Keppra) 750 mg tablet, Take 2 tablets (1,500 mg) by mouth twice a day., Disp: , Rfl:     omeprazole (PriLOSEC) 20 mg DR capsule, 1 capsule (20 mg) 2 times a day before meals., Disp: , Rfl:     ondansetron (Zofran) 4 mg tablet, Take 1 tablet (4 mg) by mouth every 6 hours if needed., Disp: , Rfl:     propranolol LA (Inderal LA) 80 mg 24 hr capsule, 1 capsule (80 mg) once daily., Disp: , Rfl:     spironolactone (Aldactone) 100 mg tablet, 1 tablet (100 mg) once daily., Disp: , Rfl:     Xifaxan 550 mg tablet, 1 tablet (550 mg) 2 times a day., Disp: , Rfl:     furosemide (Lasix) 80 mg tablet, Take 1 tablet (80 mg) by mouth 2 times daily (morning and late afternoon)., Disp: 180 tablet, Rfl: 3                     Assessment/Plan   1. Paroxysmal atrial fibrillation (Multi)        2. Abnormal EKG  Follow Up In Cardiology    Follow Up In Cardiology      3. Contusion of heart, sequela        4. BMI 37.0-37.9, adult        5. Hepatic cirrhosis, unspecified hepatic cirrhosis type, unspecified whether  ascites present (Multi)        6. Closed fracture of sternum, unspecified portion of sternum, sequela        7. Edema, unspecified type  Basic Metabolic Panel    furosemide (Lasix) 80 mg tablet    Basic Metabolic Panel      8. Seizure (Multi)        9. Cryptogenic stroke (Multi)                 Scribe Attestation  By signing my name below, Niya CHANDLER LPN, Scribe   attest that this documentation has been prepared under the direction and in the presence of Feliciano Duval MD.     Provider Attestation - Scribe documentation    All medical record entries made by the Scribe were at my direction and personally dictated by me. I have reviewed the chart and agree that the record accurately reflects my personal performance of the history, physical exam, discussion and plan.

## 2024-10-22 NOTE — LETTER
October 22, 2024     Shakir Huerta DO  [address]  [address] [place]  [place] [address]    Patient: Travis Traore   YOB: 1960   Date of Visit: 10/22/2024       Dear Dr. Shakir Huerta, DO:    Thank you for referring Travis Traore to me for evaluation. Below are my notes for this consultation.  If you have questions, please do not hesitate to call me. I look forward to following your patient along with you.       Sincerely,     Feliciano Duval MD      CC: No Recipients  ______________________________________________________________________________________    Subjective   Travis Traore is a 64 y.o. male       Chief Complaint    Follow-up          HPI   Patient is here for follow-up to management for recent evaluation for paroxysmal atrial fibrillation and abnormal ECG, he does have a history of advanced liver disease.  Since last time I saw him he was in the hospital with cryptogenic stroke.  He underwent extensive evaluation with no clear etiology was found.  Unfortunately due to his advanced liver disease and the development of seizure and documented severe thrombocytopenia due to liver disease he was not felt to be a good candidate for aspirin or anticoagulation.  He report he does have some liver lesion and is scheduled to see someone in the [facility].  Since the last time I saw him he gained quite a bit of weight and he had +2 edema.  I was able to retrieve and review his echocardiogram    Assessment     1.  Abnormal EKG showing nonspecific ST-T changes.  Patient not good candidate for invasive cardiac evaluation due to very advanced liver disease and severe thrombocytopenia.  We discussed that and with lack of chest pain or angina we elected to continue with conservative management  2.  Previous history of chest wall trauma/contusion with questionable sternal fracture and possible previous cardiac confusion does not appear to have any sequela  3.  Documented  "episode of paroxysmal atrial fibrillation currently in normal sinus rhythm.  Patient felt not to be a good candidate for anticoagulation due to history of seizure, advanced liver disease and severe thrombocytopenia  4.  Advanced liver to cirrhosis he was in the process of being evaluated for transplant with history of esophageal varices and prior TIPS procedure and area of previous history of severe GI bleed..  Patient clearly have significant volume overload and recent increased weight gain since his stroke  4.  Type 2 diabetes mellitus  5.  Recent hospitalization for cryptogenic stroke and seizure followed by neurology.  Again was felt not good candidate to be on antiplatelet or anticoagulation     Plan     1.  I recommended to continue with observant approach  2.  I reviewed his recent hospitalization record and echocardiogram  3.  I advised him to increase his Lasix to 80 mg twice daily and to have basic metabolic profile in addition I recommended salt restriction  4.  We discussed that show anticoagulation again but the family agree that he is not a good candidate due to history of seizure, prior GI bleed, advanced liver disease and risk of fall with coexisting thrombocytopenia  5.  I will see him back in the office in 6 months and follow-up  6.  I advised the family if he loses more than 15 pounds to start using the evening dose of Lasix on as-needed basis  Review of Systems   All other systems reviewed and are negative.           Vitals:    10/22/24 1309   BP: 104/64   BP Location: Left arm   Patient Position: Sitting   Pulse: 60   Weight: 112 kg (248 lb)   Height: 1.727 m (5' 8\")        Objective   Physical Exam  Constitutional:       Appearance: Normal appearance.   HENT:      Nose: Nose normal.   Neck:      Vascular: No carotid bruit.   Cardiovascular:      Rate and Rhythm: Normal rate.      Pulses: Normal pulses.      Heart sounds: Normal heart sounds.   Pulmonary:      Effort: Pulmonary effort is normal. "   Abdominal:      General: Bowel sounds are normal.      Palpations: Abdomen is soft.   Musculoskeletal:         General: Normal range of motion.      Cervical back: Normal range of motion.      Right lower le+ Edema present.      Left lower le+ Edema present.   Skin:     General: Skin is warm and dry.   Neurological:      General: No focal deficit present.      Mental Status: He is alert.   Psychiatric:         Mood and Affect: Mood normal.         Behavior: Behavior normal.         Thought Content: Thought content normal.         Judgment: Judgment normal.         Allergies  Patient has no known allergies.     Current Medications    Current Outpatient Medications:   •  cyanocobalamin, vitamin B-12, (Vitamin B-12) 5,000 mcg tablet, sublingual, Place under the tongue once daily., Disp: , Rfl:   •  ferrous sulfate, 325 mg ferrous sulfate, tablet, Take 1 tablet (325 mg) by mouth once daily., Disp: , Rfl:   •  insulin aspart (NovoLOG Flexpen U-100 Insulin) 100 unit/mL (3 mL) pen, Inject under the skin early in the morning.. Take as directed per insulin instructions., Disp: , Rfl:   •  insulin glargine-yfgn (Semglee,insulin glargine-yfgn,) 100 unit/mL vial, Inject under the skin once every 24 hours. Take as directed per insulin instructions., Disp: , Rfl:   •  lacosamide (Vimpat) 100 mg tablet, Take 1 tablet (100 mg) by mouth twice a day., Disp: , Rfl:   •  lactulose 20 gram/30 mL oral solution, Take 45 mL (30 g) by mouth once daily. (Patient taking differently: Take 45 mL (30 g) by mouth 3 times a day.), Disp: , Rfl:   •  levETIRAcetam (Keppra) 750 mg tablet, Take 2 tablets (1,500 mg) by mouth twice a day., Disp: , Rfl:   •  omeprazole (PriLOSEC) 20 mg DR capsule, 1 capsule (20 mg) 2 times a day before meals., Disp: , Rfl:   •  ondansetron (Zofran) 4 mg tablet, Take 1 tablet (4 mg) by mouth every 6 hours if needed., Disp: , Rfl:   •  propranolol LA (Inderal LA) 80 mg 24 hr capsule, 1 capsule (80 mg) once  daily., Disp: , Rfl:   •  spironolactone (Aldactone) 100 mg tablet, 1 tablet (100 mg) once daily., Disp: , Rfl:   •  Xifaxan 550 mg tablet, 1 tablet (550 mg) 2 times a day., Disp: , Rfl:   •  furosemide (Lasix) 80 mg tablet, Take 1 tablet (80 mg) by mouth 2 times daily (morning and late afternoon)., Disp: 180 tablet, Rfl: 3                     Assessment/Plan   1. Paroxysmal atrial fibrillation (Multi)        2. Abnormal EKG  Follow Up In Cardiology    Follow Up In Cardiology      3. Contusion of heart, sequela        4. BMI 37.0-37.9, adult        5. Hepatic cirrhosis, unspecified hepatic cirrhosis type, unspecified whether ascites present (Multi)        6. Closed fracture of sternum, unspecified portion of sternum, sequela        7. Edema, unspecified type  Basic Metabolic Panel    furosemide (Lasix) 80 mg tablet    Basic Metabolic Panel      8. Seizure (Multi)        9. Cryptogenic stroke (Multi)                 Scribe Attestation  By signing my name below, Niya CHANDLER LPN, Scribe   attest that this documentation has been prepared under the direction and in the presence of Feliciano Duval MD.     Provider Attestation - Scribe documentation    All medical record entries made by the Scribe were at my direction and personally dictated by me. I have reviewed the chart and agree that the record accurately reflects my personal performance of the history, physical exam, discussion and plan.

## 2024-10-22 NOTE — PATIENT INSTRUCTIONS
Please bring all medicines, vitamins, and herbal supplements with you when you come to the office.    Prescriptions will not be filled unless you are compliant with your follow up appointments or have a follow up appointment scheduled as per instruction of your physician. Refills should be requested at the time of your visit.     BMI was above normal measurement. Current weight: 112 kg (248 lb)  Weight change since last visit (-) denotes wt loss 10.9 lbs   Weight loss needed to achieve BMI 25: 83.9 Lbs  Weight loss needed to achieve BMI 30: 51.1 Lbs  Provided instructions on dietary changes.    Lasix 80 mg one tablet two times daily  Chem 6  6 months

## 2024-12-02 ENCOUNTER — APPOINTMENT (OUTPATIENT)
Dept: GASTROENTEROLOGY | Facility: CLINIC | Age: 64
End: 2024-12-02
Payer: COMMERCIAL

## 2025-01-07 PROCEDURE — 93306 TTE W/DOPPLER COMPLETE: CPT | Performed by: INTERNAL MEDICINE

## 2025-01-20 ENCOUNTER — APPOINTMENT (OUTPATIENT)
Dept: GASTROENTEROLOGY | Facility: CLINIC | Age: 65
End: 2025-01-20
Payer: COMMERCIAL

## 2025-03-04 ENCOUNTER — SPECIALTY PHARMACY (OUTPATIENT)
Dept: PHARMACY | Facility: CLINIC | Age: 65
End: 2025-03-04

## 2025-03-06 ENCOUNTER — SPECIALTY PHARMACY (OUTPATIENT)
Dept: PHARMACY | Facility: CLINIC | Age: 65
End: 2025-03-06

## 2025-03-10 PROCEDURE — RXMED WILLOW AMBULATORY MEDICATION CHARGE

## 2025-03-11 ENCOUNTER — PHARMACY VISIT (OUTPATIENT)
Dept: PHARMACY | Facility: CLINIC | Age: 65
End: 2025-03-11
Payer: COMMERCIAL

## 2025-03-12 ENCOUNTER — SPECIALTY PHARMACY (OUTPATIENT)
Dept: PHARMACY | Facility: CLINIC | Age: 65
End: 2025-03-12

## 2025-03-12 NOTE — PROGRESS NOTES
[facility] Clinical Note  Initial Patient Education     Introduction  Travis Traore is a 64 y.o. male who is on the specialty pharmacy service for management of: Oncology Core.    Travis Traore is initiating the following therapy: lenvatinib (8 mg daily dose), Take 2 capsules (8 total mg) by mouth daily    Medication receipt date: 03/12/2025  Duration of therapy: Maintenance    The most recent encounter visit with the referring prescriber Kevin Caro II, DO on 02/25/2025 was reviewed.  Pharmacy will continue to collaborate in the care of this patient with the referring prescriber.    Clinical Background  An initial assessment was conducted prior to first fill of the medication to determine the appropriateness of therapy given the patient's diagnosis, medication list, comorbidities, allergies, medical history, patient's ability to self administer medication, and therapeutic goals based on possible outcomes of therapy. Refer to initial assessment task completed on 03/07/2025.    Labs/Procedures for clinical appropriateness that were reviewed include:   Oncology - CBC-diff:   Lab Results   Component Value Date    WBC 4.3 (L) 09/06/2024    RBC 3.96 (L) 09/06/2024    HGB 14.2 09/06/2024    HCT 39.3 (L) 09/06/2024    MCV 99 09/06/2024    MCHC 36.1 (H) 09/06/2024    PLT 50 (L) 09/06/2024    RDW 14.0 09/06/2024    NEUTOPHILPCT 63.8 03/27/2020    IGPCT 0.7 03/27/2020    LYMPHOPCT 13.5 03/27/2020    MONOPCT 15.0 03/27/2020    EOSPCT 6.0 03/27/2020    BASOPCT 1.0 03/27/2020    NEUTROABS 2.64 03/27/2020    LYMPHSABS 0.56 (L) 03/27/2020    MONOSABS 0.62 03/27/2020    EOSABS 0.25 03/27/2020    BASOSABS 0.04 03/27/2020    and CMP:   Lab Results   Component Value Date    GLUCOSE 145 (H) 09/06/2024     09/06/2024    K 4.8 09/06/2024     09/06/2024    CO2 27 09/06/2024    ANIONGAP 9 (L) 09/06/2024    BUN 7 09/06/2024    CREATININE 0.58 09/06/2024    CALCIUM 8.6 09/06/2024    ALBUMIN  3.0 (L) 09/06/2024    ALKPHOS 100 09/06/2024    PROT 4.6 (L) 09/06/2024    AST 37 09/06/2024    BILITOT 3.8 (H) 09/06/2024    ALT 31 09/06/2024       Education/Discussion  Travis was contacted on 3/12/2025 at 10:17 AM for a pharmacy visit with encounter number [phone] from:   [facility]  [address] [place] [id number]  Dept: [phone]  Dept Fax: [phone]  Travis consented to a/an Telephone visit, which was performed.    Medication Start Date (planned or actual): pt was told by his doctor to call office when he receives - doctor to inform him when to start  Education was conducted prior to start of therapy? Yes    Education discussed includes the following:  Patient Education  Counseled the Patient on the Following : Theraputic rationale and expected outcomes, Doses and administration, Adherence and missed doses, Possible side effects and management, Possible drug interactions, Lab monitoring and follow-up, Safe handling, storage, and disposal, Contraindications and precautions, Associated vaccinations, Pharmacy contact information  Learner: Patient  Education Method: Explanation  Education Response: Verbalizes understanding  Additional details of the medication specific counseling are found within the linked patient education flowsheet.     The follow up timeline was discussed. Every person responds to and reacts to therapy differently. Patient should be assessed for efficacy and tolerability in approximately: 10-14 days    Provided education on goals and possible outcomes of therapy:  Adherence with therapy  Timely completion of appropriate labs  Timely and appropriate follow up with provider  Identify and address medication interactions with presciption medications, OTC medications and supplements  Optimize or maintain quality of life  Oncology: Prolong life/No disease progression  Manage side effects (ex: nausea/vomiting, constipation, fatigue) in conjunction with  care team    The importance of adherence was discussed and they were advised to take the medication as prescribed by their provider.     Impression/Plan  Review and Assessment   Reviewed During This Encounter: Medications, Allergies, Problem list, Immunizations  Medications Assessed for Appropriate Use, Dose, Route, Frequency, and Duration: Yes  Medication Reconciliation Completed: Yes  Drug Interactions Evaluated: Yes  Clinically Relevant Drug Interactions Identified: No    This patient has not been identified as high risk due to Lack of high risk qualifiers.  The following action was taken: N/A.         The  Specialty Pharmacy Welcome packet may be viewed here:   Specialty Pharmacy Welcome Packet     Or by scanning QR code:      Provided contact information ([phone]) for [facility] and reviewed dispensing process, refill timeline and patient management follow up. Advised to contact the pharmacy if there are any adverse effects and/or changes to medication list, including prescriptions, OTC medications, herbal products, or supplements. Confirmed understanding of education conducted during assessment. All questions and concerns were addressed and patient was encouraged to reach out for additional questions or concerns.      Jaclyn Benton, PharmD

## 2025-03-26 ENCOUNTER — SPECIALTY PHARMACY (OUTPATIENT)
Dept: PHARMACY | Facility: CLINIC | Age: 65
End: 2025-03-26

## 2025-03-26 NOTE — PROGRESS NOTES
[facility] Clinical Note  Patient Reassessment     Introduction  Travis Traore is a 64 y.o. male who is on the specialty pharmacy service for management of: Oncology Core.      [facility] supplied medication: Lenvima     Duration of therapy: Maintenance    The most recent encounter visit with the referring prescriber Kevin Caro was reviewed (external provider).  Pharmacy will continue to collaborate in the care of this patient with the referring prescriber.    Discussion  Travis was contacted on 3/26/2025 at 3:48 PM for a pharmacy visit with encounter number [id number] from:   [facility]  [address] [place] [id number]  Dept: [phone]  Dept Fax: [phone]  Travis and Spouse consented to a/an Telephone visit, which was performed.    Efficacy  Patient has developed new symptoms of condition: No  Patient/caregiver feels medication is affecting the disease state: medication on hold right now due to side effects.    Goals  Provided education on goals and possible outcomes of therapy:  Adherence with therapy  Timely completion of appropriate labs  Timely and appropriate follow up with provider  Identify and address medication interactions with presciption medications, OTC medications and supplements  Optimize or maintain quality of life  Oncology: Prolong life/No disease progression  Manage side effects (ex: nausea/vomiting, constipation, fatigue) in conjunction with care team  Patient has documented target(s) for goals of therapy: No    Tolerance  Patient has experienced side effects from this medication: Yes - nausea and redness of face - provider is aware and has pt holding mediation for 7-10 days. Provider might dec dose  Changes to current therapy regimen: No    The follow-up timeline was discussed. Every person responds to and reacts to therapy differently. Patient should be assessed for efficacy and tolerability in approximately: 3  months    Adherence  Patient Information  Informant: Self (Patient)  Demonstrates Understanding of Importance of Adherence: Yes  Does the patient have any barriers to self-administration (including physical and mental?): No  Medication Information  Medication: lenvatinib mesylate (Lenvima)  Patient Reported Missed Doses in the Last 4 Weeks: 0 (medication was on hold for ~10 days per provider due to side effects)  Estimated Medication Adherence Level: Good  Adherence Estimation Source: Claims history  Barriers to Adherence: Adverse Effects  Action Taken to Address Barriers to Adherence: had redness on face, provider is aware. had pt hold medication. might dec dose   The importance of adherence was discussed and patient/caregiver was advised to take the medication as prescribed by their provider. Encouraged patient/caregiver to call physician's office or specialty pharmacy if they have a question regarding a missed dose.    General Assessment  Changes to home medications, OTCs or supplements: No  Current Outpatient Medications   Medication Sig Dispense Refill    cyanocobalamin, vitamin B-12, (Vitamin B-12) 5,000 mcg tablet, sublingual Place under the tongue once daily.      ferrous sulfate, 325 mg ferrous sulfate, tablet Take 1 tablet (325 mg) by mouth once daily.      furosemide (Lasix) 80 mg tablet Take 1 tablet (80 mg) by mouth 2 times daily (morning and late afternoon). 180 tablet 3    insulin aspart (NovoLOG Flexpen U-100 Insulin) 100 unit/mL (3 mL) pen Inject under the skin early in the morning.. Take as directed per insulin instructions.      insulin glargine-yfgn (Semglee,insulin glargine-yfgn,) 100 unit/mL vial Inject under the skin once every 24 hours. Take as directed per insulin instructions.      lacosamide (Vimpat) 100 mg tablet Take 1 tablet (100 mg) by mouth twice a day.      lactulose 20 gram/30 mL oral solution Take 45 mL (30 g) by mouth once daily. (Patient taking differently: Take 45 mL (30 g) by  mouth 3 times a day.)      lenvatinib 8 mg daily dose (Lenvima) 2 x 4 mg capsule therapy pack Take 2 capsules(8 total mg) by mouth daily 60 capsule 1    levETIRAcetam (Keppra) 750 mg tablet Take 2 tablets (1,500 mg) by mouth twice a day.      omeprazole (PriLOSEC) 20 mg DR capsule 1 capsule (20 mg) 2 times a day before meals.      ondansetron (Zofran) 4 mg tablet Take 1 tablet (4 mg) by mouth every 6 hours if needed.      propranolol LA (Inderal LA) 80 mg 24 hr capsule 1 capsule (80 mg) once daily.      spironolactone (Aldactone) 100 mg tablet 1 tablet (100 mg) once daily.      Xifaxan 550 mg tablet 1 tablet (550 mg) 2 times a day.       No current facility-administered medications for this visit.     Reported new allergies: No  Reported new medical conditions: No  Additional monitoring reviewed: Oncology - CBC-diff:   Lab Results   Component Value Date    WBC 4.3 (L) 09/06/2024    RBC 3.96 (L) 09/06/2024    HGB 14.2 09/06/2024    HCT 39.3 (L) 09/06/2024    MCV 99 09/06/2024    MCHC 36.1 (H) 09/06/2024    PLT 50 (L) 09/06/2024    RDW 14.0 09/06/2024    NEUTOPHILPCT 63.8 03/27/2020    IGPCT 0.7 03/27/2020    LYMPHOPCT 13.5 03/27/2020    MONOPCT 15.0 03/27/2020    EOSPCT 6.0 03/27/2020    BASOPCT 1.0 03/27/2020    NEUTROABS 2.64 03/27/2020    LYMPHSABS 0.56 (L) 03/27/2020    MONOSABS 0.62 03/27/2020    EOSABS 0.25 03/27/2020    BASOSABS 0.04 03/27/2020    and CMP:   Lab Results   Component Value Date    GLUCOSE 145 (H) 09/06/2024     09/06/2024    K 4.8 09/06/2024     09/06/2024    CO2 27 09/06/2024    ANIONGAP 9 (L) 09/06/2024    BUN 7 09/06/2024    CREATININE 0.58 09/06/2024    CALCIUM 8.6 09/06/2024    ALBUMIN 3.0 (L) 09/06/2024    ALKPHOS 100 09/06/2024    PROT 4.6 (L) 09/06/2024    AST 37 09/06/2024    BILITOT 3.8 (H) 09/06/2024    ALT 31 09/06/2024     Is laboratory follow up needed? No    Advised to contact the pharmacy if there are any changes to the patient's medication list, including  prescriptions, OTC medications, herbal products, or supplements.    Impression/Plan  This patient has not been identified as high risk due to Lack of high risk qualifiers.  The following action was taken: N/A.    QOL/Patient Satisfaction  Rate your quality of life on scale of 1-10: 7  Rate your satisfaction with  Specialty Pharmacy on scale of 1-10: 10 - Completely satisfied    Provided contact information ([phone]) for [facility] and reviewed dispensing process, refill timeline and patient management follow up. Confirmed understanding of education conducted during assessment. All questions and concerns were addressed and patient/caregiver was encouraged to reach out for additional questions or concerns.    Based on the patient's diagnosis, medication list, progress towards goals, adherence, tolerance, and medication list, medication remains appropriate: Therapy remains appropriate (I attest)    Shiraz Can, PharmD

## 2025-04-11 ENCOUNTER — APPOINTMENT (OUTPATIENT)
Dept: CARDIOLOGY | Facility: CLINIC | Age: 65
End: 2025-04-11
Payer: COMMERCIAL

## 2025-05-05 ENCOUNTER — APPOINTMENT (OUTPATIENT)
Dept: CARDIOLOGY | Facility: CLINIC | Age: 65
End: 2025-05-05
Payer: COMMERCIAL

## 2025-05-05 VITALS
WEIGHT: 239 LBS | HEIGHT: 70 IN | HEART RATE: 68 BPM | BODY MASS INDEX: 34.22 KG/M2 | SYSTOLIC BLOOD PRESSURE: 110 MMHG | DIASTOLIC BLOOD PRESSURE: 66 MMHG

## 2025-05-05 DIAGNOSIS — C22.0 HEPATOCELLULAR CARCINOMA: ICD-10-CM

## 2025-05-05 DIAGNOSIS — I63.9 CRYPTOGENIC STROKE (MULTI): ICD-10-CM

## 2025-05-05 DIAGNOSIS — K74.60 HEPATIC CIRRHOSIS, UNSPECIFIED HEPATIC CIRRHOSIS TYPE, UNSPECIFIED WHETHER ASCITES PRESENT (MULTI): ICD-10-CM

## 2025-05-05 DIAGNOSIS — I48.0 PAROXYSMAL ATRIAL FIBRILLATION (MULTI): Primary | ICD-10-CM

## 2025-05-05 DIAGNOSIS — R60.9 EDEMA, UNSPECIFIED TYPE: ICD-10-CM

## 2025-05-05 DIAGNOSIS — R23.3 EASY BRUISABILITY: ICD-10-CM

## 2025-05-05 DIAGNOSIS — S22.20XS CLOSED FRACTURE OF STERNUM, UNSPECIFIED PORTION OF STERNUM, SEQUELA: ICD-10-CM

## 2025-05-05 DIAGNOSIS — R94.31 ABNORMAL EKG: ICD-10-CM

## 2025-05-05 PROBLEM — S26.91XA CARDIAC CONTUSION: Status: RESOLVED | Noted: 2024-04-26 | Resolved: 2025-05-05

## 2025-05-05 PROCEDURE — 3074F SYST BP LT 130 MM HG: CPT | Performed by: INTERNAL MEDICINE

## 2025-05-05 PROCEDURE — 3078F DIAST BP <80 MM HG: CPT | Performed by: INTERNAL MEDICINE

## 2025-05-05 PROCEDURE — 1036F TOBACCO NON-USER: CPT | Performed by: INTERNAL MEDICINE

## 2025-05-05 PROCEDURE — 3008F BODY MASS INDEX DOCD: CPT | Performed by: INTERNAL MEDICINE

## 2025-05-05 PROCEDURE — 99214 OFFICE O/P EST MOD 30 MIN: CPT | Performed by: INTERNAL MEDICINE

## 2025-05-05 RX ORDER — CALCIUM CARBONATE 500(1250)
1 TABLET ORAL DAILY
COMMUNITY

## 2025-05-05 RX ORDER — FOLIC ACID 1 MG/1
1 TABLET ORAL DAILY
COMMUNITY
Start: 2025-03-21

## 2025-05-05 RX ORDER — TAMSULOSIN HYDROCHLORIDE 0.4 MG/1
0.4 CAPSULE ORAL DAILY
COMMUNITY
Start: 2025-03-21

## 2025-05-05 RX ORDER — BACLOFEN 20 MG
600 TABLET ORAL 2 TIMES DAILY
COMMUNITY

## 2025-05-05 NOTE — LETTER
May 5, 2025     Shakir Huerta DO  [address]  [address] [place]  [place] [address]    Patient: Travis Traore   YOB: 1960   Date of Visit: 5/5/2025       Dear Dr. Shakir Huerta, DO:    Thank you for referring Travis Traore to me for evaluation. Below are my notes for this consultation.  If you have questions, please do not hesitate to call me. I look forward to following your patient along with you.       Sincerely,     Feliciano Duval MD      CC: No Recipients  ______________________________________________________________________________________    Chief Complaint   Patient presents with   • Follow-up     6 months Paroxysmal atrial fibrillation (Mult  Contusion of heart, sequela         Subjective   Travis Traore is a 64 y.o. male     HPI   Patient is here for follow-up to management for previous evaluation for abnormal EKG and paroxysmal atrial fibrillation.  Decision was made for conservative management in view of his advanced liver disease and severe thrombocytopenia.  Reviewing the record indicate that since I saw him he was in the hospital for hepatic encephalopathy and the record suggest that he also been treating for hepatocellular carcinoma.  Unfortunately he seem to be not able to tolerate immunotherapy and currently on oral treatment.  He has lost few pounds since the last time I saw him.  He is compliant with his medication.    Assessment     1.  Abnormal EKG showing nonspecific ST-T change with previous documentation paroxysmal atrial fibrillation .  Patient not good candidate for invasive cardiac evaluation due to very advanced liver disease and severe thrombocytopenia and history of hepatocellular carcinoma.  We discussed that and with lack of chest pain or angina we elected to continue with conservative management  2.  Previous history of chest wall trauma/contusion with questionable sternal fracture and possible previous cardiac confusion does not  "appear to have any sequela  3.  Documented episode of paroxysmal atrial fibrillation currently in normal sinus rhythm.  Patient felt not to be a good candidate for anticoagulation due to history of seizure, advanced liver disease and severe thrombocytopenia  4.  Advanced liver to cirrhosis and history of hepatocellular carcinoma he was in the process of being evaluated for transplant with history of esophageal varices and prior TIPS procedure and area of previous history of severe GI bleed..  Patient clearly have significant volume overload and recent increased weight gain since his stroke  4.  Type 2 diabetes mellitus  5.  Previous hospitalization for cryptogenic stroke and seizure followed by neurology.  Again was felt not good candidate to be on antiplatelet or anticoagulation  6.  Recent hospitalization for hepatic coma and treatment for hepatocellular carcinoma  7.  Easy bruisability due to thrombocytopenia     Plan     1.  I recommended to continue with observant approach  2.  I reviewed his recent hospitalization record and echocardiogram  3.  I advised him to continue his Lasix to 80 mg twice daily and to have basic metabolic profile in addition I recommended salt restriction  4.  We discussed that show anticoagulation again but the family agree that he is not a good candidate due to history of seizure, prior GI bleed, advanced liver disease and risk of fall with coexisting thrombocytopenia  5.  I will see him back in the office in 6 months and follow-up    Review of Systems   All other systems reviewed and are negative.           Vitals:    05/05/25 1122   BP: 110/66   BP Location: Right arm   Patient Position: Sitting   Pulse: 68   Weight: 108 kg (239 lb)   Height: 1.778 m (5' 10\")        Objective   Physical Exam  Constitutional:       Appearance: Normal appearance.   HENT:      Nose: Nose normal.   Neck:      Vascular: No carotid bruit.   Cardiovascular:      Rate and Rhythm: Normal rate.      Pulses: " Normal pulses.      Heart sounds: Normal heart sounds.   Pulmonary:      Effort: Pulmonary effort is normal.   Abdominal:      General: Bowel sounds are normal.      Palpations: Abdomen is soft.   Musculoskeletal:         General: Normal range of motion.      Cervical back: Normal range of motion.      Right lower leg: No edema.      Left lower leg: No edema.   Skin:     General: Skin is warm and dry.   Neurological:      General: No focal deficit present.      Mental Status: He is alert.   Psychiatric:         Mood and Affect: Mood normal.         Behavior: Behavior normal.         Thought Content: Thought content normal.         Judgment: Judgment normal.         Allergies  Patient has no known allergies.     Current Medications  Current Outpatient Medications   Medication Instructions   • calcium carbonate (Oscal) 500 mg calcium (1,250 mg) tablet 1 tablet, Daily   • cyanocobalamin, vitamin B-12, (Vitamin B-12) 5,000 mcg tablet, sublingual Daily   • ferrous sulfate, 325 mg ferrous sulfate, tablet 1 tablet, Daily   • folic acid (FOLVITE) 1 mg, Daily   • furosemide (LASIX) 80 mg, oral, 2 times daily (morning and late afternoon)   • insulin aspart (NovoLOG Flexpen U-100 Insulin) 100 unit/mL (3 mL) pen Daily   • insulin glargine-yfgn (Semglee,insulin glargine-yfgn,) 100 unit/mL vial Every 24 hours   • lacosamide (VIMPAT) 100 mg, 2 times daily   • lactulose 30 g, Daily   • lenvatinib 8 mg daily dose (Lenvima) 2 x 4 mg capsule therapy pack Take 2 capsules(8 total mg) by mouth daily   • levETIRAcetam (KEPPRA) 1,500 mg, 2 times daily   • magnesium oxide 600 mg, 2 times daily   • omeprazole (PRILOSEC) 20 mg, 2 times daily before meals   • ondansetron (Zofran) 4 mg tablet 1 tablet, Every 6 hours PRN   • spironolactone (ALDACTONE) 100 mg, Daily   • tamsulosin (FLOMAX) 0.4 mg, Daily   • Xifaxan 550 mg tablet 1 tablet, 2 times daily                        Assessment/Plan   1. Paroxysmal atrial fibrillation (Multi)  Follow Up  In Cardiology      2. Abnormal EKG  Follow Up In Cardiology      3. Closed fracture of sternum, unspecified portion of sternum, sequela        4. Cryptogenic stroke (Multi)        5. Edema, unspecified type        6. BMI 34.0-34.9,adult        7. Hepatic cirrhosis, unspecified hepatic cirrhosis type, unspecified whether ascites present (Multi)        8. Hepatocellular carcinoma        9. Easy bruisability                 Scribe Attestation  By signing my name below, ILucretia LPN  , Junioribe   attest that this documentation has been prepared under the direction and in the presence of Feliciano Duval MD.     Provider Attestation - Scribe documentation    All medical record entries made by the Scribe were at my direction and personally dictated by me. I have reviewed the chart and agree that the record accurately reflects my personal performance of the history, physical exam, discussion and plan.

## 2025-05-05 NOTE — PROGRESS NOTES
Chief Complaint   Patient presents with    Follow-up     6 months Paroxysmal atrial fibrillation (Mult  Contusion of heart, sequela         Subjective   Travis Traore is a 64 y.o. male     HPI   Patient is here for follow-up to management for previous evaluation for abnormal EKG and paroxysmal atrial fibrillation.  Decision was made for conservative management in view of his advanced liver disease and severe thrombocytopenia.  Reviewing the record indicate that since I saw him he was in the hospital for hepatic encephalopathy and the record suggest that he also been treating for hepatocellular carcinoma.  Unfortunately he seem to be not able to tolerate immunotherapy and currently on oral treatment.  He has lost few pounds since the last time I saw him.  He is compliant with his medication.    Assessment     1.  Abnormal EKG showing nonspecific ST-T change with previous documentation paroxysmal atrial fibrillation .  Patient not good candidate for invasive cardiac evaluation due to very advanced liver disease and severe thrombocytopenia and history of hepatocellular carcinoma.  We discussed that and with lack of chest pain or angina we elected to continue with conservative management  2.  Previous history of chest wall trauma/contusion with questionable sternal fracture and possible previous cardiac confusion does not appear to have any sequela  3.  Documented episode of paroxysmal atrial fibrillation currently in normal sinus rhythm.  Patient felt not to be a good candidate for anticoagulation due to history of seizure, advanced liver disease and severe thrombocytopenia  4.  Advanced liver to cirrhosis and history of hepatocellular carcinoma he was in the process of being evaluated for transplant with history of esophageal varices and prior TIPS procedure and area of previous history of severe GI bleed..  Patient clearly have significant volume overload and recent increased weight gain since his stroke  4.  Type 2  "diabetes mellitus  5.  Previous hospitalization for cryptogenic stroke and seizure followed by neurology.  Again was felt not good candidate to be on antiplatelet or anticoagulation  6.  Recent hospitalization for hepatic coma and treatment for hepatocellular carcinoma  7.  Easy bruisability due to thrombocytopenia     Plan     1.  I recommended to continue with observant approach  2.  I reviewed his recent hospitalization record and echocardiogram  3.  I advised him to continue his Lasix to 80 mg twice daily and to have basic metabolic profile in addition I recommended salt restriction  4.  We discussed that show anticoagulation again but the family agree that he is not a good candidate due to history of seizure, prior GI bleed, advanced liver disease and risk of fall with coexisting thrombocytopenia  5.  I will see him back in the office in 6 months and follow-up    Review of Systems   All other systems reviewed and are negative.           Vitals:    05/05/25 1122   BP: 110/66   BP Location: Right arm   Patient Position: Sitting   Pulse: 68   Weight: 108 kg (239 lb)   Height: 1.778 m (5' 10\")        Objective   Physical Exam  Constitutional:       Appearance: Normal appearance.   HENT:      Nose: Nose normal.   Neck:      Vascular: No carotid bruit.   Cardiovascular:      Rate and Rhythm: Normal rate.      Pulses: Normal pulses.      Heart sounds: Normal heart sounds.   Pulmonary:      Effort: Pulmonary effort is normal.   Abdominal:      General: Bowel sounds are normal.      Palpations: Abdomen is soft.   Musculoskeletal:         General: Normal range of motion.      Cervical back: Normal range of motion.      Right lower leg: No edema.      Left lower leg: No edema.   Skin:     General: Skin is warm and dry.   Neurological:      General: No focal deficit present.      Mental Status: He is alert.   Psychiatric:         Mood and Affect: Mood normal.         Behavior: Behavior normal.         Thought Content: " Thought content normal.         Judgment: Judgment normal.         Allergies  Patient has no known allergies.     Current Medications  Current Outpatient Medications   Medication Instructions    calcium carbonate (Oscal) 500 mg calcium (1,250 mg) tablet 1 tablet, Daily    cyanocobalamin, vitamin B-12, (Vitamin B-12) 5,000 mcg tablet, sublingual Daily    ferrous sulfate, 325 mg ferrous sulfate, tablet 1 tablet, Daily    folic acid (FOLVITE) 1 mg, Daily    furosemide (LASIX) 80 mg, oral, 2 times daily (morning and late afternoon)    insulin aspart (NovoLOG Flexpen U-100 Insulin) 100 unit/mL (3 mL) pen Daily    insulin glargine-yfgn (Semglee,insulin glargine-yfgn,) 100 unit/mL vial Every 24 hours    lacosamide (VIMPAT) 100 mg, 2 times daily    lactulose 30 g, Daily    lenvatinib 8 mg daily dose (Lenvima) 2 x 4 mg capsule therapy pack Take 2 capsules(8 total mg) by mouth daily    levETIRAcetam (KEPPRA) 1,500 mg, 2 times daily    magnesium oxide 600 mg, 2 times daily    omeprazole (PRILOSEC) 20 mg, 2 times daily before meals    ondansetron (Zofran) 4 mg tablet 1 tablet, Every 6 hours PRN    spironolactone (ALDACTONE) 100 mg, Daily    tamsulosin (FLOMAX) 0.4 mg, Daily    Xifaxan 550 mg tablet 1 tablet, 2 times daily                        Assessment/Plan   1. Paroxysmal atrial fibrillation (Multi)  Follow Up In Cardiology      2. Abnormal EKG  Follow Up In Cardiology      3. Closed fracture of sternum, unspecified portion of sternum, sequela        4. Cryptogenic stroke (Multi)        5. Edema, unspecified type        6. BMI 34.0-34.9,adult        7. Hepatic cirrhosis, unspecified hepatic cirrhosis type, unspecified whether ascites present (Multi)        8. Hepatocellular carcinoma        9. Easy bruisability                 Scribe Attestation  By signing my name below, I, Lucretia CEJA LPN  , Scribe   attest that this documentation has been prepared under the direction and in the presence of Feliciano Duval MD.      Provider Attestation - Scribe documentation    All medical record entries made by the Scribe were at my direction and personally dictated by me. I have reviewed the chart and agree that the record accurately reflects my personal performance of the history, physical exam, discussion and plan.

## 2025-05-05 NOTE — PATIENT INSTRUCTIONS
Please bring all medicines, vitamins, and herbal supplements with you when you come to the office.    Prescriptions will not be filled unless you are compliant with your follow up appointments or have a follow up appointment scheduled as per instruction of your physician. Refills should be requested at the time of your visit.     BMI was above normal measurement. Current weight: 108 kg (239 lb)  Weight change since last visit (-) denotes wt loss -9 lbs   Weight loss needed to achieve BMI 25: 65.1 Lbs  Weight loss needed to achieve BMI 30: 30.4 Lbs  Provided instructions on dietary changes       moderate impairment

## 2025-06-04 ENCOUNTER — SPECIALTY PHARMACY (OUTPATIENT)
Dept: PHARMACY | Facility: CLINIC | Age: 65
End: 2025-06-04

## 2025-06-04 ENCOUNTER — PHARMACY VISIT (OUTPATIENT)
Dept: PHARMACY | Facility: CLINIC | Age: 65
End: 2025-06-04
Payer: COMMERCIAL

## 2025-06-04 PROCEDURE — RXMED WILLOW AMBULATORY MEDICATION CHARGE

## 2025-06-05 ENCOUNTER — SPECIALTY PHARMACY (OUTPATIENT)
Dept: PHARMACY | Facility: CLINIC | Age: 65
End: 2025-06-05

## 2025-06-05 NOTE — PROGRESS NOTES
[facility] Clinical Note  Initial Patient Education     Introduction  Travis Traore is a 64 y.o. male who is on the specialty pharmacy service for management of: Oncology Core.    Travis Traore is initiating the following therapy: Cabometyx --Take 40mg (1 tablet) by mouth once daily       Medication receipt date: 6/5/25    Duration of therapy: Maintenance    The most recent encounter visit with the referring prescriber Kevin Caro on 5/30/25 was reviewed.  Pharmacy will continue to collaborate in the care of this patient with the referring prescriber.    Clinical Background  An initial assessment was conducted prior to first fill of the medication to determine the appropriateness of therapy given the patient's diagnosis, medication list, comorbidities, allergies, medical history, patient's ability to self administer medication, and therapeutic goals based on possible outcomes of therapy. Refer to initial assessment task completed on 6/4/25.    Labs for clinical appropriateness that were reviewed include:   Oncology - CBC-diff:   Lab Results   Component Value Date    WBC 4.3 (L) 09/06/2024    RBC 3.96 (L) 09/06/2024    HGB 14.2 09/06/2024    HCT 39.3 (L) 09/06/2024    MCV 99 09/06/2024    MCHC 36.1 (H) 09/06/2024    PLT 50 (L) 09/06/2024    RDW 14.0 09/06/2024    NEUTOPHILPCT 63.8 03/27/2020    IGPCT 0.7 03/27/2020    LYMPHOPCT 13.5 03/27/2020    MONOPCT 15.0 03/27/2020    EOSPCT 6.0 03/27/2020    BASOPCT 1.0 03/27/2020    NEUTROABS 2.64 03/27/2020    LYMPHSABS 0.56 (L) 03/27/2020    MONOSABS 0.62 03/27/2020    EOSABS 0.25 03/27/2020    BASOSABS 0.04 03/27/2020    and CMP:   Lab Results   Component Value Date    GLUCOSE 145 (H) 09/06/2024     09/06/2024    K 4.8 09/06/2024     09/06/2024    CO2 27 09/06/2024    ANIONGAP 9 (L) 09/06/2024    BUN 7 09/06/2024    CREATININE 0.58 09/06/2024    CALCIUM 8.6 09/06/2024    ALBUMIN 3.0 (L) 09/06/2024    ALKPHOS 100 09/06/2024     PROT 4.6 (L) 09/06/2024    AST 37 09/06/2024    BILITOT 3.8 (H) 09/06/2024    ALT 31 09/06/2024       Education/Discussion  Travis was contacted on 6/5/2025 at 3:41 PM for a pharmacy visit with encounter number [phone] from:   [facility]  [address] [address]  Dept: [phone]  Dept Fax: [phone]  Travis consented to a/an Telephone visit, which was performed.    Medication Start Date (planned or actual): 6/6/25         Education was conducted prior to start of therapy? Yes    Education discussed includes the following:  Patient Education  Counseled the Patient on the Following : Doses and administration, Adherence and missed doses, Possible side effects and management, Safe handling, storage, and disposal, Pharmacy contact information  Learner: Patient  Education Method: Explanation  Education Response: Verbalizes understanding  Additional details of the medication specific counseling are found within the linked patient education flowsheet.     The follow up timeline was discussed. Every person responds to and reacts to therapy differently. Patient should be assessed for efficacy and tolerability in approximately: 3 months    Provided education on goals and possible outcomes of therapy:  Adherence with therapy  Timely completion of appropriate labs  Timely and appropriate follow up with provider  Identify and address medication interactions with presciption medications, OTC medications and supplements  Optimize or maintain quality of life  Oncology: Prolong life/No disease progression  Manage side effects (ex: nausea/vomiting, constipation, fatigue) in conjunction with care team           The importance of adherence was discussed and they were advised to take the medication as prescribed by their provider.         Impression/Plan  Review and Assessment   Reviewed During This Encounter: Medications  Medications Assessed for Appropriate Use, Dose, Route,  Frequency, and Duration: Yes  Medication Reconciliation Completed: No (Comment)  Drug Interactions Evaluated: Yes  Clinically Relevant Drug Interactions Identified: No    This patient has not been identified as high risk due to Lack of high risk qualifiers.  The following action was taken: N/A.    QOL/Patient Satisfaction  Rate your quality of life on scale of 1-10: 7  Rate your satisfaction with  Specialty Pharmacy on scale of 1-10: 10 - Completely satisfied    The  Specialty Pharmacy Welcome packet may be viewed here:   Specialty Pharmacy Welcome Packet     Or by scanning QR code:      Provided contact information ([phone]) for [facility] and reviewed dispensing process, refill timeline and patient management follow up. Advised to contact the pharmacy if there are any adverse effects and/or changes to medication list, including prescriptions, OTC medications, herbal products, or supplements. Confirmed understanding of education conducted during assessment. All questions and concerns were addressed and patient was encouraged to reach out for additional questions or concerns.      Shiraz Can, StefanieD

## 2025-06-11 ENCOUNTER — SPECIALTY PHARMACY (OUTPATIENT)
Dept: PHARMACY | Facility: CLINIC | Age: 65
End: 2025-06-11

## 2025-06-17 PROCEDURE — RXMED WILLOW AMBULATORY MEDICATION CHARGE

## 2025-06-20 ENCOUNTER — PHARMACY VISIT (OUTPATIENT)
Dept: PHARMACY | Facility: CLINIC | Age: 65
End: 2025-06-20
Payer: COMMERCIAL

## 2025-06-26 ENCOUNTER — SPECIALTY PHARMACY (OUTPATIENT)
Dept: PHARMACY | Facility: CLINIC | Age: 65
End: 2025-06-26

## 2025-07-14 ENCOUNTER — SPECIALTY PHARMACY (OUTPATIENT)
Dept: PHARMACY | Facility: CLINIC | Age: 65
End: 2025-07-14

## 2025-07-14 PROCEDURE — RXMED WILLOW AMBULATORY MEDICATION CHARGE

## 2025-07-25 ENCOUNTER — PHARMACY VISIT (OUTPATIENT)
Dept: PHARMACY | Facility: CLINIC | Age: 65
End: 2025-07-25
Payer: COMMERCIAL

## 2025-08-13 ENCOUNTER — SPECIALTY PHARMACY (OUTPATIENT)
Dept: PHARMACY | Facility: CLINIC | Age: 65
End: 2025-08-13

## 2025-11-06 ENCOUNTER — APPOINTMENT (OUTPATIENT)
Dept: CARDIOLOGY | Facility: CLINIC | Age: 65
End: 2025-11-06
Payer: COMMERCIAL